# Patient Record
Sex: FEMALE | Race: WHITE | NOT HISPANIC OR LATINO | Employment: FULL TIME
[De-identification: names, ages, dates, MRNs, and addresses within clinical notes are randomized per-mention and may not be internally consistent; named-entity substitution may affect disease eponyms.]

---

## 2017-08-04 ENCOUNTER — RECORDS - HEALTHEAST (OUTPATIENT)
Dept: ADMINISTRATIVE | Facility: OTHER | Age: 57
End: 2017-08-04

## 2020-09-11 ENCOUNTER — HOSPITAL ENCOUNTER (OUTPATIENT)
Facility: CLINIC | Age: 60
Setting detail: OBSERVATION
Discharge: HOME | End: 2020-09-12
Attending: FAMILY MEDICINE | Admitting: SURGERY
Payer: COMMERCIAL

## 2020-09-11 ENCOUNTER — ANESTHESIA (OUTPATIENT)
Dept: SURGERY | Facility: CLINIC | Age: 60
End: 2020-09-11

## 2020-09-11 ENCOUNTER — ANESTHESIA EVENT (OUTPATIENT)
Dept: SURGERY | Facility: CLINIC | Age: 60
End: 2020-09-11
Payer: COMMERCIAL

## 2020-09-11 ENCOUNTER — APPOINTMENT (OUTPATIENT)
Dept: ULTRASOUND IMAGING | Facility: CLINIC | Age: 60
End: 2020-09-11
Attending: FAMILY MEDICINE
Payer: COMMERCIAL

## 2020-09-11 ENCOUNTER — ANESTHESIA EVENT (OUTPATIENT)
Dept: SURGERY | Facility: CLINIC | Age: 60
End: 2020-09-11

## 2020-09-11 DIAGNOSIS — Z20.828 VIRAL DISEASE EXPOSURE: ICD-10-CM

## 2020-09-11 DIAGNOSIS — Z90.49 S/P LAPAROSCOPIC CHOLECYSTECTOMY: ICD-10-CM

## 2020-09-11 DIAGNOSIS — K80.00 CALCULUS OF GALLBLADDER WITH ACUTE CHOLECYSTITIS WITHOUT OBSTRUCTION: ICD-10-CM

## 2020-09-11 DIAGNOSIS — K80.00 CALCULUS OF GALLBLADDER WITH ACUTE CHOLECYSTITIS WITHOUT OBSTRUCTION: Primary | ICD-10-CM

## 2020-09-11 DIAGNOSIS — K82.8 ADHESION OF GALLBLADDER: ICD-10-CM

## 2020-09-11 PROBLEM — K80.20 GALLSTONES: Status: ACTIVE | Noted: 2020-09-11

## 2020-09-11 LAB
ALBUMIN SERPL-MCNC: 4.1 G/DL (ref 3.4–5)
ALBUMIN UR-MCNC: NEGATIVE MG/DL
ALP SERPL-CCNC: 89 U/L (ref 40–150)
ALT SERPL W P-5'-P-CCNC: 29 U/L (ref 0–50)
AMORPH CRY #/AREA URNS HPF: ABNORMAL /HPF
ANION GAP SERPL CALCULATED.3IONS-SCNC: 8 MMOL/L (ref 3–14)
APPEARANCE UR: ABNORMAL
AST SERPL W P-5'-P-CCNC: 22 U/L (ref 0–45)
BASOPHILS # BLD AUTO: 0.1 10E9/L (ref 0–0.2)
BASOPHILS NFR BLD AUTO: 0.7 %
BILIRUB SERPL-MCNC: 0.4 MG/DL (ref 0.2–1.3)
BILIRUB UR QL STRIP: NEGATIVE
BUN SERPL-MCNC: 17 MG/DL (ref 7–30)
CALCIUM SERPL-MCNC: 10.3 MG/DL (ref 8.5–10.1)
CHLORIDE SERPL-SCNC: 105 MMOL/L (ref 94–109)
CO2 SERPL-SCNC: 28 MMOL/L (ref 20–32)
COLOR UR AUTO: ABNORMAL
CREAT SERPL-MCNC: 0.62 MG/DL (ref 0.52–1.04)
DIFFERENTIAL METHOD BLD: ABNORMAL
EOSINOPHIL # BLD AUTO: 0.1 10E9/L (ref 0–0.7)
EOSINOPHIL NFR BLD AUTO: 1.8 %
ERYTHROCYTE [DISTWIDTH] IN BLOOD BY AUTOMATED COUNT: 14.4 % (ref 10–15)
GFR SERPL CREATININE-BSD FRML MDRD: >90 ML/MIN/{1.73_M2}
GLUCOSE SERPL-MCNC: 134 MG/DL (ref 70–99)
GLUCOSE UR STRIP-MCNC: NEGATIVE MG/DL
HCT VFR BLD AUTO: 42.8 % (ref 35–47)
HGB BLD-MCNC: 13.6 G/DL (ref 11.7–15.7)
HGB UR QL STRIP: NEGATIVE
IMM GRANULOCYTES # BLD: 0 10E9/L (ref 0–0.4)
IMM GRANULOCYTES NFR BLD: 0.1 %
KETONES UR STRIP-MCNC: 5 MG/DL
LABORATORY COMMENT REPORT: NORMAL
LEUKOCYTE ESTERASE UR QL STRIP: NEGATIVE
LIPASE SERPL-CCNC: 110 U/L (ref 73–393)
LYMPHOCYTES # BLD AUTO: 1.9 10E9/L (ref 0.8–5.3)
LYMPHOCYTES NFR BLD AUTO: 26.3 %
MCH RBC QN AUTO: 26.3 PG (ref 26.5–33)
MCHC RBC AUTO-ENTMCNC: 31.8 G/DL (ref 31.5–36.5)
MCV RBC AUTO: 83 FL (ref 78–100)
MONOCYTES # BLD AUTO: 0.5 10E9/L (ref 0–1.3)
MONOCYTES NFR BLD AUTO: 7.4 %
NEUTROPHILS # BLD AUTO: 4.5 10E9/L (ref 1.6–8.3)
NEUTROPHILS NFR BLD AUTO: 63.7 %
NITRATE UR QL: NEGATIVE
NRBC # BLD AUTO: 0 10*3/UL
NRBC BLD AUTO-RTO: 0 /100
PH UR STRIP: 6 PH (ref 5–7)
PLATELET # BLD AUTO: 343 10E9/L (ref 150–450)
POTASSIUM SERPL-SCNC: 3.4 MMOL/L (ref 3.4–5.3)
PROT SERPL-MCNC: 8.1 G/DL (ref 6.8–8.8)
RBC # BLD AUTO: 5.18 10E12/L (ref 3.8–5.2)
RBC #/AREA URNS AUTO: 1 /HPF (ref 0–2)
SARS-COV-2 RNA SPEC QL NAA+PROBE: NEGATIVE
SARS-COV-2 RNA SPEC QL NAA+PROBE: NORMAL
SODIUM SERPL-SCNC: 141 MMOL/L (ref 133–144)
SOURCE: ABNORMAL
SP GR UR STRIP: 1.01 (ref 1–1.03)
SPECIMEN SOURCE: NORMAL
SPECIMEN SOURCE: NORMAL
SQUAMOUS #/AREA URNS AUTO: 8 /HPF (ref 0–1)
TROPONIN I SERPL-MCNC: <0.015 UG/L (ref 0–0.04)
UROBILINOGEN UR STRIP-MCNC: 0 MG/DL (ref 0–2)
WBC # BLD AUTO: 7 10E9/L (ref 4–11)
WBC #/AREA URNS AUTO: 2 /HPF (ref 0–5)

## 2020-09-11 PROCEDURE — 96366 THER/PROPH/DIAG IV INF ADDON: CPT | Performed by: FAMILY MEDICINE

## 2020-09-11 PROCEDURE — 25800030 ZZH RX IP 258 OP 636: Performed by: FAMILY MEDICINE

## 2020-09-11 PROCEDURE — 99285 EMERGENCY DEPT VISIT HI MDM: CPT | Mod: Z6 | Performed by: FAMILY MEDICINE

## 2020-09-11 PROCEDURE — 84484 ASSAY OF TROPONIN QUANT: CPT | Performed by: FAMILY MEDICINE

## 2020-09-11 PROCEDURE — 25800030 ZZH RX IP 258 OP 636: Performed by: NURSE ANESTHETIST, CERTIFIED REGISTERED

## 2020-09-11 PROCEDURE — 81001 URINALYSIS AUTO W/SCOPE: CPT | Performed by: FAMILY MEDICINE

## 2020-09-11 PROCEDURE — G0378 HOSPITAL OBSERVATION PER HR: HCPCS

## 2020-09-11 PROCEDURE — 99285 EMERGENCY DEPT VISIT HI MDM: CPT | Mod: 25 | Performed by: FAMILY MEDICINE

## 2020-09-11 PROCEDURE — 96361 HYDRATE IV INFUSION ADD-ON: CPT | Performed by: FAMILY MEDICINE

## 2020-09-11 PROCEDURE — 96365 THER/PROPH/DIAG IV INF INIT: CPT | Performed by: FAMILY MEDICINE

## 2020-09-11 PROCEDURE — 83690 ASSAY OF LIPASE: CPT | Performed by: FAMILY MEDICINE

## 2020-09-11 PROCEDURE — 25000128 H RX IP 250 OP 636: Performed by: FAMILY MEDICINE

## 2020-09-11 PROCEDURE — U0003 INFECTIOUS AGENT DETECTION BY NUCLEIC ACID (DNA OR RNA); SEVERE ACUTE RESPIRATORY SYNDROME CORONAVIRUS 2 (SARS-COV-2) (CORONAVIRUS DISEASE [COVID-19]), AMPLIFIED PROBE TECHNIQUE, MAKING USE OF HIGH THROUGHPUT TECHNOLOGIES AS DESCRIBED BY CMS-2020-01-R: HCPCS | Performed by: FAMILY MEDICINE

## 2020-09-11 PROCEDURE — 85025 COMPLETE CBC W/AUTO DIFF WBC: CPT | Performed by: FAMILY MEDICINE

## 2020-09-11 PROCEDURE — 80053 COMPREHEN METABOLIC PANEL: CPT | Performed by: FAMILY MEDICINE

## 2020-09-11 PROCEDURE — 25800030 ZZH RX IP 258 OP 636: Performed by: SURGERY

## 2020-09-11 PROCEDURE — 25000128 H RX IP 250 OP 636: Performed by: SURGERY

## 2020-09-11 PROCEDURE — C9803 HOPD COVID-19 SPEC COLLECT: HCPCS | Performed by: FAMILY MEDICINE

## 2020-09-11 PROCEDURE — 96375 TX/PRO/DX INJ NEW DRUG ADDON: CPT | Performed by: FAMILY MEDICINE

## 2020-09-11 PROCEDURE — 76700 US EXAM ABDOM COMPLETE: CPT

## 2020-09-11 PROCEDURE — 99203 OFFICE O/P NEW LOW 30 MIN: CPT | Mod: 57 | Performed by: SURGERY

## 2020-09-11 RX ORDER — ACETAMINOPHEN 325 MG/1
975 TABLET ORAL ONCE
Status: DISCONTINUED | OUTPATIENT
Start: 2020-09-11 | End: 2020-09-11 | Stop reason: HOSPADM

## 2020-09-11 RX ORDER — LIDOCAINE 40 MG/G
CREAM TOPICAL
Status: DISCONTINUED | OUTPATIENT
Start: 2020-09-11 | End: 2020-09-11 | Stop reason: HOSPADM

## 2020-09-11 RX ORDER — SODIUM CHLORIDE, SODIUM LACTATE, POTASSIUM CHLORIDE, CALCIUM CHLORIDE 600; 310; 30; 20 MG/100ML; MG/100ML; MG/100ML; MG/100ML
INJECTION, SOLUTION INTRAVENOUS CONTINUOUS
Status: DISCONTINUED | OUTPATIENT
Start: 2020-09-11 | End: 2020-09-11 | Stop reason: HOSPADM

## 2020-09-11 RX ORDER — KETOROLAC TROMETHAMINE 15 MG/ML
15 INJECTION, SOLUTION INTRAMUSCULAR; INTRAVENOUS ONCE
Status: COMPLETED | OUTPATIENT
Start: 2020-09-11 | End: 2020-09-11

## 2020-09-11 RX ORDER — LIDOCAINE 40 MG/G
CREAM TOPICAL
Status: DISCONTINUED | OUTPATIENT
Start: 2020-09-11 | End: 2020-09-12 | Stop reason: HOSPADM

## 2020-09-11 RX ORDER — DEXTROSE MONOHYDRATE, SODIUM CHLORIDE, AND POTASSIUM CHLORIDE 50; 1.49; 4.5 G/1000ML; G/1000ML; G/1000ML
INJECTION, SOLUTION INTRAVENOUS CONTINUOUS
Status: DISCONTINUED | OUTPATIENT
Start: 2020-09-11 | End: 2020-09-11

## 2020-09-11 RX ORDER — INDOCYANINE GREEN AND WATER 25 MG
2.5 KIT INJECTION ONCE
Status: DISCONTINUED | OUTPATIENT
Start: 2020-09-11 | End: 2020-09-11 | Stop reason: HOSPADM

## 2020-09-11 RX ORDER — PROCHLORPERAZINE MALEATE 5 MG
10 TABLET ORAL EVERY 6 HOURS PRN
Status: DISCONTINUED | OUTPATIENT
Start: 2020-09-11 | End: 2020-09-12 | Stop reason: HOSPADM

## 2020-09-11 RX ORDER — ACETAMINOPHEN 325 MG/1
650 TABLET ORAL EVERY 4 HOURS PRN
Status: DISCONTINUED | OUTPATIENT
Start: 2020-09-11 | End: 2020-09-12 | Stop reason: HOSPADM

## 2020-09-11 RX ORDER — NALOXONE HYDROCHLORIDE 0.4 MG/ML
.1-.4 INJECTION, SOLUTION INTRAMUSCULAR; INTRAVENOUS; SUBCUTANEOUS
Status: DISCONTINUED | OUTPATIENT
Start: 2020-09-11 | End: 2020-09-12 | Stop reason: HOSPADM

## 2020-09-11 RX ORDER — ONDANSETRON 2 MG/ML
4 INJECTION INTRAMUSCULAR; INTRAVENOUS EVERY 6 HOURS PRN
Status: DISCONTINUED | OUTPATIENT
Start: 2020-09-11 | End: 2020-09-12 | Stop reason: HOSPADM

## 2020-09-11 RX ORDER — HYDROMORPHONE HYDROCHLORIDE 1 MG/ML
0.5 INJECTION, SOLUTION INTRAMUSCULAR; INTRAVENOUS; SUBCUTANEOUS
Status: DISCONTINUED | OUTPATIENT
Start: 2020-09-11 | End: 2020-09-12 | Stop reason: HOSPADM

## 2020-09-11 RX ORDER — ONDANSETRON 2 MG/ML
4 INJECTION INTRAMUSCULAR; INTRAVENOUS ONCE
Status: COMPLETED | OUTPATIENT
Start: 2020-09-11 | End: 2020-09-11

## 2020-09-11 RX ORDER — PROCHLORPERAZINE 25 MG
25 SUPPOSITORY, RECTAL RECTAL EVERY 12 HOURS PRN
Status: DISCONTINUED | OUTPATIENT
Start: 2020-09-11 | End: 2020-09-12 | Stop reason: HOSPADM

## 2020-09-11 RX ORDER — GABAPENTIN 300 MG/1
300 CAPSULE ORAL ONCE
Status: DISCONTINUED | OUTPATIENT
Start: 2020-09-11 | End: 2020-09-11 | Stop reason: HOSPADM

## 2020-09-11 RX ORDER — ONDANSETRON 4 MG/1
4 TABLET, ORALLY DISINTEGRATING ORAL EVERY 6 HOURS PRN
Status: DISCONTINUED | OUTPATIENT
Start: 2020-09-11 | End: 2020-09-12 | Stop reason: HOSPADM

## 2020-09-11 RX ORDER — HYDROMORPHONE HYDROCHLORIDE 1 MG/ML
0.5 INJECTION, SOLUTION INTRAMUSCULAR; INTRAVENOUS; SUBCUTANEOUS ONCE
Status: COMPLETED | OUTPATIENT
Start: 2020-09-11 | End: 2020-09-11

## 2020-09-11 RX ADMIN — HYDROMORPHONE HYDROCHLORIDE 0.5 MG: 1 INJECTION, SOLUTION INTRAMUSCULAR; INTRAVENOUS; SUBCUTANEOUS at 06:00

## 2020-09-11 RX ADMIN — DEXTROSE AND SODIUM CHLORIDE: 5; 900 INJECTION, SOLUTION INTRAVENOUS at 16:02

## 2020-09-11 RX ADMIN — TAZOBACTAM SODIUM AND PIPERACILLIN SODIUM 3.38 G: 375; 3 INJECTION, SOLUTION INTRAVENOUS at 21:53

## 2020-09-11 RX ADMIN — ONDANSETRON 4 MG: 2 INJECTION INTRAMUSCULAR; INTRAVENOUS at 05:20

## 2020-09-11 RX ADMIN — TAZOBACTAM SODIUM AND PIPERACILLIN SODIUM 3.38 G: 375; 3 INJECTION, SOLUTION INTRAVENOUS at 08:35

## 2020-09-11 RX ADMIN — TAZOBACTAM SODIUM AND PIPERACILLIN SODIUM 3.38 G: 375; 3 INJECTION, SOLUTION INTRAVENOUS at 16:03

## 2020-09-11 RX ADMIN — POTASSIUM CHLORIDE, DEXTROSE MONOHYDRATE AND SODIUM CHLORIDE: 150; 5; 450 INJECTION, SOLUTION INTRAVENOUS at 08:16

## 2020-09-11 RX ADMIN — SODIUM CHLORIDE, POTASSIUM CHLORIDE, SODIUM LACTATE AND CALCIUM CHLORIDE 10 ML: 600; 310; 30; 20 INJECTION, SOLUTION INTRAVENOUS at 13:08

## 2020-09-11 RX ADMIN — SODIUM CHLORIDE, POTASSIUM CHLORIDE, SODIUM LACTATE AND CALCIUM CHLORIDE 1000 ML: 600; 310; 30; 20 INJECTION, SOLUTION INTRAVENOUS at 05:17

## 2020-09-11 RX ADMIN — KETOROLAC TROMETHAMINE 15 MG: 15 INJECTION, SOLUTION INTRAMUSCULAR; INTRAVENOUS at 05:20

## 2020-09-11 ASSESSMENT — MIFFLIN-ST. JEOR
SCORE: 1317.51
SCORE: 1321.5

## 2020-09-11 NOTE — ANESTHESIA PREPROCEDURE EVALUATION
Anesthesia Pre-Procedure Evaluation    Patient: Kindra Herbert   MRN: [id number] : 1960          Preoperative Diagnosis: Calculus of gallbladder with acute cholecystitis without obstruction [K80.00]    Procedure(s):  CHOLECYSTECTOMY, LAPAROSCOPIC    Past Medical History:   Diagnosis Date     History of laparoscopy      History of total vaginal hysterectomy      Right kidney stone 2014    laser lithotripsy with stent placement     Vaginal vault prolapse, posthysterectomy      No past surgical history on file.    Anesthesia Evaluation     . Pt has had prior anesthetic. Type: General and MAC    No history of anesthetic complications          ROS/MED HX    ENT/Pulmonary:  - neg pulmonary ROS     Neurologic:     (+)migraines,     Cardiovascular:     (+) Dyslipidemia, ----. : . . . :. . Previous cardiac testing date:results:date: results:ECG reviewed date:16 results:Sinus Rhythm   -Nonspecific QRS widening.     BORDERLINE   date: results:          METS/Exercise Tolerance:  >4 METS   Hematologic:  - neg hematologic  ROS       Musculoskeletal:   (+) arthritis,  other musculoskeletal- bilateral knee pain      GI/Hepatic: Comment: Fecal incontinence    (+) GERD Other, cholecystitis/cholelithiasis,       Renal/Genitourinary:     (+) Nephrolithiasis ,       Endo:  - neg endo ROS       Psychiatric:     (+) psychiatric history anxiety      Infectious Disease:  - neg infectious disease ROS       Malignancy:      - no malignancy   Other:    - neg other ROS                        Physical Exam  Normal systems: cardiovascular and pulmonary    Airway   Mallampati: I  TM distance: >3 FB  Neck ROM: full    Dental   (+) caps    Cardiovascular       Pulmonary             Lab Results   Component Value Date    WBC 7.0 2020    HGB 13.6 2020    HCT 42.8 2020     2020     2020    POTASSIUM 3.4 2020    CHLORIDE 105 2020    CO2 28 2020    BUN 17 2020    CR  "0.62 09/11/2020     (H) 09/11/2020    GRUPO 10.3 (H) 09/11/2020    ALBUMIN 4.1 09/11/2020    PROTTOTAL 8.1 09/11/2020    ALT 29 09/11/2020    AST 22 09/11/2020    ALKPHOS 89 09/11/2020    BILITOTAL 0.4 09/11/2020    LIPASE 110 09/11/2020       Preop Vitals  BP Readings from Last 3 Encounters:   09/11/20 114/59   11/09/16 111/83   07/28/13 135/86    Pulse Readings from Last 3 Encounters:   09/11/20 73   11/09/16 86   07/28/13 89      Resp Readings from Last 3 Encounters:   09/11/20 16   11/09/16 16   07/28/13 16    SpO2 Readings from Last 3 Encounters:   09/11/20 98%   11/09/16 100%   07/28/13 100%      Temp Readings from Last 1 Encounters:   09/11/20 36.7  C (98.1  F) (Oral)    Ht Readings from Last 1 Encounters:   09/11/20 1.676 m (5' 5.98\")      Wt Readings from Last 1 Encounters:   09/11/20 73 kg (160 lb 15 oz)    Estimated body mass index is 25.99 kg/m  as calculated from the following:    Height as of 9/11/20: 1.676 m (5' 5.98\").    Weight as of 9/11/20: 73 kg (160 lb 15 oz).       Anesthesia Plan      History & Physical Review  History and physical reviewed and following examination; no interval change.    ASA Status:  2 emergent.    NPO Status:  > 6 hours    Plan for General with Propofol and Intravenous induction. Maintenance will be Balanced.    PONV prophylaxis:  Ondansetron (or other 5HT-3) and Dexamethasone or Solumedrol  Additional equipment: Videolaryngoscope        Postoperative Care  Postoperative pain management:  IV analgesics, Oral pain medications and Multi-modal analgesia.      Consents  Anesthetic plan, risks, benefits and alternatives discussed with:  Patient..                   JUAQUIN Shin CRNA  "

## 2020-09-11 NOTE — ANESTHESIA PREPROCEDURE EVALUATION
Anesthesia Pre-Procedure Evaluation    Patient: Kindra Herbert   MRN: [id number] : 1960          Preoperative Diagnosis: Calculus of gallbladder with acute cholecystitis without obstruction [K80.00]    Procedure(s):  CHOLECYSTECTOMY, LAPAROSCOPIC    No past medical history on file.  No past surgical history on file.    Anesthesia Evaluation     .             ROS/MED HX    ENT/Pulmonary:  - neg pulmonary ROS     Neurologic:     (+)migraines,     Cardiovascular:     (+) Dyslipidemia, ----. : . . . :. . Previous cardiac testing date:results:date: results:ECG reviewed date:16 results:Sinus Rhythm   -Nonspecific QRS widening.     BORDERLINE   date: results:          METS/Exercise Tolerance:  >4 METS   Hematologic:  - neg hematologic  ROS       Musculoskeletal:   (+) arthritis,  other musculoskeletal- bilateral knee pain      GI/Hepatic: Comment: Fecal incontinence    (+) cholecystitis/cholelithiasis,       Renal/Genitourinary:     (+) Nephrolithiasis ,       Endo:  - neg endo ROS       Psychiatric:     (+) psychiatric history anxiety      Infectious Disease:  - neg infectious disease ROS       Malignancy:      - no malignancy   Other:    - neg other ROS                             Lab Results   Component Value Date    WBC 7.0 2020    HGB 13.6 2020    HCT 42.8 2020     2020     2020    POTASSIUM 3.4 2020    CHLORIDE 105 2020    CO2 28 2020    BUN 17 2020    CR 0.62 2020     (H) 2020    GRUPO 10.3 (H) 2020    ALBUMIN 4.1 2020    PROTTOTAL 8.1 2020    ALT 29 2020    AST 22 2020    ALKPHOS 89 2020    BILITOTAL 0.4 2020    LIPASE 110 2020       Preop Vitals  BP Readings from Last 3 Encounters:   20 94/56   16 111/83   13 135/86    Pulse Readings from Last 3 Encounters:   20 (!) 48   16 86   13 89      Resp Readings from Last 3 Encounters:  "  09/11/20 18   11/09/16 16   07/28/13 16    SpO2 Readings from Last 3 Encounters:   09/11/20 95%   11/09/16 100%   07/28/13 100%      Temp Readings from Last 1 Encounters:   09/11/20 36.9  C (98.4  F) (Oral)    Ht Readings from Last 1 Encounters:   09/11/20 1.676 m (5' 6\")      Wt Readings from Last 1 Encounters:   09/11/20 72.6 kg (160 lb)    Estimated body mass index is 25.82 kg/m  as calculated from the following:    Height as of this encounter: 1.676 m (5' 6\").    Weight as of this encounter: 72.6 kg (160 lb).                   JUAQUIN Shin CRNA  "

## 2020-09-11 NOTE — ED TRIAGE NOTES
Pt arrives by private car. She developed epigastric pain  10 hrs ago after eating. She describes it as epigastric and constant . It now radiates to the mid back. She has a long Hx of kidney stones solis states this is a different pain. She has has nausea and dry heaves. She denies any problems or changes with bowel or bladder. No blood in urine or stool. No fevers. She appears uncomfortable. She does not feel bloated.

## 2020-09-11 NOTE — PLAN OF CARE
"[facility] [place]MISSION [place]    Patient admitted to room 2407 at approximately 1545 via wheel chair from ED. Patient was accompanied by transport tech.     Verbal SBAR report received from Naima prior to patient arrival.     Patient ambulated to bed independently. Patient alert and oriented X 3. The patient is not having any pain. 0-10 Pain Scale: 10. Admission vital signs: Blood pressure 114/59, pulse 73, temperature 98.1  F (36.7  C), temperature source Oral, resp. rate 16, height 1.676 m (5' 5.98\"), weight 73 kg (160 lb 15 oz), SpO2 98 %. Patient was oriented to plan of care, call light, bed controls, tv, telephone, bathroom, and visiting hours.     Risk Assessment    The following safety risks were identified during admission: fall. Yellow risk band applied: YES.     Skin Initial Assessment    This writer admitted this patient and completed a full skin assessment and Mayur score in the Adult PCS flowsheet. Appropriate interventions initiated as needed.     Secondary skin check completed by: Cecy Arenas    Patient has a Martin to Observation order: Yes  Observation education completed and documented: Yes      Colleen Day RN      "

## 2020-09-11 NOTE — CONSULTS
PCP:  Clinic, [facility]  Requesting: Dr. Guillermo Wen    Chief complaint: Right upper quadrant and back pain, diagnosis of gallstones    History of Present Illness: Kindra is a 60-year-old female with a fairly unremarkable past medical history who presented to the emergency room early this morning with back pain.  She had a fairly abrupt onset of pain in her right flank and into her back.  This started approximately 11 hours prior to her coming in.  The pain was described as a dull and aching sensation and occurred an hour or so after the light meal.  Some nausea but no vomiting.  She had a CT scan here at outside facility for complaints of right-sided abdominal pain a week ago and was noted to have a 2.1 cm gallstone.  She has not received the report from that exam as of her visit this morning.    She reports no change in the color of her urine or stool.  Her only abdominal procedure is a laparoscopy in the 1980s.    All of her labs were normal.  An ultrasound of her gallbladder showed a stone in the neck of the gallbladder and a negative sonographic Feliz sign.  No biliary ductal dilatation.    Histories:  Past Medical History:   Diagnosis Date     History of laparoscopy 1987     History of total vaginal hysterectomy 2006     Right kidney stone 2014    laser lithotripsy with stent placement     Vaginal vault prolapse, posthysterectomy        History reviewed. No pertinent surgical history.    History reviewed. No pertinent family history.    Social History     Tobacco Use     Smoking status: Not on file   Substance Use Topics     Alcohol use: Not on file       No current outpatient medications on file.       Allergies   Allergen Reactions     Sulfa Drugs Hives       Images:  Recent Results (from the past 744 hour(s))   Abdomen US, complete    Narrative    ULTRASOUND ABDOMEN COMPLETE 9/11/2020 7:00 AM    CLINICAL HISTORY: Epigastric pain.    TECHNIQUE: Complete abdominal ultrasound.    COMPARISON: CT of the  abdomen and pelvis performed 7/28/2013.    FINDINGS:    GALLBLADDER: A gallstone is noted within the gallbladder neck. A 1.1  cm echogenic focus along the gallbladder wall may represent sludge or  a gallbladder polyp. No gallbladder wall thickening or pericholecystic  fluid. Negative sonographic Feliz sign.    BILE DUCTS: No biliary dilatation. The common duct measures 5 mm. The  entire common duct could not be visualized on this exam.    LIVER: Normal parenchyma with smooth contour. No focal mass.    RIGHT KIDNEY: Normal size. Normal echogenicity with no hydronephrosis  or mass.     LEFT KIDNEY: Normal size. Normal echogenicity with no hydronephrosis  or mass.     SPLEEN: Normal.    PANCREAS: The visualized portions are normal.    AORTA: Normal in caliber.     IVC: Normal where visualized.    No ascites.      Impression    IMPRESSION:  1.  Cholelithiasis. No associated gallbladder wall thickening.  2.  Small amount of sludge or a gallbladder polyp measures 1.1 cm. A  follow-up ultrasound in 6 months is recommended to ensure stability of  this finding.  3.  Otherwise unremarkable abdominal ultrasound.    MAITE CHRISTIE MD       Labs:  Results for orders placed or performed during the hospital encounter of 09/11/20   Abdomen US, complete     Status: None    Narrative    ULTRASOUND ABDOMEN COMPLETE 9/11/2020 7:00 AM    CLINICAL HISTORY: Epigastric pain.    TECHNIQUE: Complete abdominal ultrasound.    COMPARISON: CT of the abdomen and pelvis performed 7/28/2013.    FINDINGS:    GALLBLADDER: A gallstone is noted within the gallbladder neck. A 1.1  cm echogenic focus along the gallbladder wall may represent sludge or  a gallbladder polyp. No gallbladder wall thickening or pericholecystic  fluid. Negative sonographic Feliz sign.    BILE DUCTS: No biliary dilatation. The common duct measures 5 mm. The  entire common duct could not be visualized on this exam.    LIVER: Normal parenchyma with smooth contour. No focal  mass.    RIGHT KIDNEY: Normal size. Normal echogenicity with no hydronephrosis  or mass.     LEFT KIDNEY: Normal size. Normal echogenicity with no hydronephrosis  or mass.     SPLEEN: Normal.    PANCREAS: The visualized portions are normal.    AORTA: Normal in caliber.     IVC: Normal where visualized.    No ascites.      Impression    IMPRESSION:  1.  Cholelithiasis. No associated gallbladder wall thickening.  2.  Small amount of sludge or a gallbladder polyp measures 1.1 cm. A  follow-up ultrasound in 6 months is recommended to ensure stability of  this finding.  3.  Otherwise unremarkable abdominal ultrasound.    MAITE CHRISTIE MD   CBC with platelets differential     Status: Abnormal   Result Value Ref Range    WBC 7.0 4.0 - 11.0 10e9/L    RBC Count 5.18 3.8 - 5.2 10e12/L    Hemoglobin 13.6 11.7 - 15.7 g/dL    Hematocrit 42.8 35.0 - 47.0 %    MCV 83 78 - 100 fl    MCH 26.3 (L) 26.5 - 33.0 pg    MCHC 31.8 31.5 - 36.5 g/dL    RDW 14.4 10.0 - 15.0 %    Platelet Count 343 150 - 450 10e9/L    Diff Method Automated Method     % Neutrophils 63.7 %    % Lymphocytes 26.3 %    % Monocytes 7.4 %    % Eosinophils 1.8 %    % Basophils 0.7 %    % Immature Granulocytes 0.1 %    Nucleated RBCs 0 0 /100    Absolute Neutrophil 4.5 1.6 - 8.3 10e9/L    Absolute Lymphocytes 1.9 0.8 - 5.3 10e9/L    Absolute Monocytes 0.5 0.0 - 1.3 10e9/L    Absolute Eosinophils 0.1 0.0 - 0.7 10e9/L    Absolute Basophils 0.1 0.0 - 0.2 10e9/L    Abs Immature Granulocytes 0.0 0 - 0.4 10e9/L    Absolute Nucleated RBC 0.0    Comprehensive metabolic panel     Status: Abnormal   Result Value Ref Range    Sodium 141 133 - 144 mmol/L    Potassium 3.4 3.4 - 5.3 mmol/L    Chloride 105 94 - 109 mmol/L    Carbon Dioxide 28 20 - 32 mmol/L    Anion Gap 8 3 - 14 mmol/L    Glucose 134 (H) 70 - 99 mg/dL    Urea Nitrogen 17 7 - 30 mg/dL    Creatinine 0.62 0.52 - 1.04 mg/dL    GFR Estimate >90 >60 mL/min/[1.73_m2]    GFR Estimate If Black >90 >60 mL/min/[1.73_m2]     Calcium 10.3 (H) 8.5 - 10.1 mg/dL    Bilirubin Total 0.4 0.2 - 1.3 mg/dL    Albumin 4.1 3.4 - 5.0 g/dL    Protein Total 8.1 6.8 - 8.8 g/dL    Alkaline Phosphatase 89 40 - 150 U/L    ALT 29 0 - 50 U/L    AST 22 0 - 45 U/L   UA reflex to Microscopic     Status: Abnormal   Result Value Ref Range    Color Urine Straw     Appearance Urine Slightly Cloudy     Glucose Urine Negative NEG^Negative mg/dL    Bilirubin Urine Negative NEG^Negative    Ketones Urine 5 (A) NEG^Negative mg/dL    Specific Gravity Urine 1.014 1.003 - 1.035    Blood Urine Negative NEG^Negative    pH Urine 6.0 5.0 - 7.0 pH    Protein Albumin Urine Negative NEG^Negative mg/dL    Urobilinogen mg/dL 0.0 0.0 - 2.0 mg/dL    Nitrite Urine Negative NEG^Negative    Leukocyte Esterase Urine Negative NEG^Negative    Source Unspecified Urine     RBC Urine 1 0 - 2 /HPF    WBC Urine 2 0 - 5 /HPF    Squamous Epithelial /HPF Urine 8 (H) 0 - 1 /HPF    Amorphous Crystals Few (A) NEG^Negative /HPF   Lipase     Status: None   Result Value Ref Range    Lipase 110 73 - 393 U/L   Troponin I     Status: None   Result Value Ref Range    Troponin I ES <0.015 0.000 - 0.045 ug/L   Asymptomatic COVID-19 Virus (Coronavirus) by PCR     Status: None    Specimen: Nasopharyngeal   Result Value Ref Range    COVID-19 Virus PCR to [facility] - Source Nasopharyngeal     COVID-19 Virus PCR to [facility] - Result       Test received-See reflex to IDDL test SARS CoV2 (COVID-19) Virus RT-PCR   SARS-CoV-2 COVID-19 Virus (Coronavirus) RT-PCR Nasopharyngeal     Status: None    Specimen: Nasopharyngeal   Result Value Ref Range    SARS-CoV-2 Virus Specimen Source Nasopharyngeal     SARS-CoV-2 PCR Result NEGATIVE     SARS-CoV-2 PCR Comment       Testing was performed using the [employer] Xpress SARS-CoV-2 Assay on the Cepheid Gene-Xpert   Instrument Systems. Additional information about this Emergency Use Authorization (EUA)   assay can be found via the Lab Guide.         ROS:  Constitutional - Denies fevers,  "weight loss, malaise, lethargy  Neuro - Denies tremors or seizures  Pulmon - Denies SOB, dyspnea, hemoptysis, chronic cough or use of an inhaler  CV - Denies CP, SOB, lower extremity edema, difficulty w/ stairs  GI - Denies hematemesis, BRBPR, melena, chronic diarrhea   - Denies hematuria, difficulty voiding  Hematology - Denies blood clotting disorders, chronic anemias      /69 (BP Location: Right arm)   Pulse 76   Temp 98.7  F (37.1  C) (Oral)   Resp 18   Ht 1.676 m (5' 6\")   Wt 72.6 kg (160 lb)   SpO2 96%   BMI 25.82 kg/m      Exam:  General - Alert and Oriented X4, NAD, well nourished  HEENT - Normocephalic, atraumatic  Lungs -respirations are unlabored, chest wall excursion normal  CV - Heart RRR  Abdomen - Soft, mild discomfort to palpation right upper quadrant, no masses, well-healed small infraumbilical incision, no umbilical hernia  Neuro - intact  Extremities - No cyanosis, clubbing or edema.      Assessment and Plan: Symptomatic cholelithiasis bordering on acute cholecystitis.  At the time of my visit her pain was improving, but she is fearful of eating anything because the pain might recur.  I spent about 30 minutes with her and her  discussing the anatomy and pathophysiology of biliary disease including the procedure itself (laparoscopic cholecystectomy).  We discussed the risks benefits and alternatives.  Initially, plans were made to proceed with surgery today, but her COVID test was delayed.  By the time it was available, surgical staff were busy with a more urgent case.    For that reason, and issues of potential pain control I elected to bring her in the hospital on observation overnight and plan for surgery at 8 AM tomorrow.  Dr. Munoz has graciously agreed to do the procedure as she is on-call this weekend.  The patient was agreeable.  I anticipate she will be home by noon tomorrow.  All of her questions were answered.  Consultation time was 30 minutes in face-to-face " contact.        Vipin Castro MD FACS

## 2020-09-11 NOTE — OR NURSING
Patient is now being admitted to observation due to Covid test results still pending. Will have surgery in the am.

## 2020-09-11 NOTE — ED PROVIDER NOTES
History     Chief Complaint   Patient presents with     Abdominal Pain     asociated with back pain last 10 hrs. Nausea     HPI  Kindra Herbert is a 60 year old female, past medical history is significant for hypertension, irritable bowel syndrome, presents to the emergency department with concerns of abdominal pain beginning approximately 11 hours prior to presentation.  History is obtained from patient who presents with her  with concerns of epigastric area abdominal pain that radiates bilaterally into the back.  The pain is described as dull and aching and has onset approximately half an hour after a light nonfatty meal (the patient recently changed her diet and is now eating far less carbohydrate and far more protein; her dinner consisted of a grilled hamburger topped with fuentes, cheese, and tomatoes, no bun).  No alcohol recently.  Mild nausea but no vomiting.  The patient tried Tums as well as acetaminophen which did nothing for the pain.  Seems to be a little bit better if she is leaning forward.  Not associate with any lightheadedness shortness of air palpitations.  The patient states that she has had kidney stones in the past but this pain is very different as the kidney stone pain is typically unilateral rather than midline and on both sides.    The patient notes that she had abdominal CT scan performed through her primary care provider for right lower quadrant pain and weakness approximately a week ago.  She has not heard the results.  I reviewed care everywhere the resulted CT as follows:    DATE/TIME: 9/2/2020 3:35 PM    INDICATION: Abdominal Pain, Generalized Abdominal Pain, Rlq (right Lower  Quadrant)  COMPARISON: 03/16/2017  TECHNIQUE: CT scan of the abdomen and pelvis was performed following injection  of IV contrast. Multiplanar reformats were obtained. Dose reduction techniques  were used.  CONTRAST: IOHEXOL 350 MG IODINE/ML  ML BOTTLE: 80mL    FINDINGS:   LOWER CHEST:  Normal.    HEPATOBILIARY: There is a 2.1 cm gallstone in the gallbladder.    PANCREAS: Normal.    SPLEEN: Normal.    ADRENAL GLANDS: Normal.    KIDNEYS/BLADDER: In the kidneys are low-density lesions, measuring up to 1.2 cm  on the right. These may represent small cysts, not requiring additional  follow-up imaging. There are calcifications in both kidneys, measuring up to 9 x  2 mm in the left lower renal pole and 7 x 4 mm in the right interpolar region.  The latter is new from prior, with decreased stones seen in the lower pole. No  hydronephrosis.    BOWEL: Stomach and small bowel are unremarkable. No obstruction. The appendix is  visualized, and within normal limits. Colon unremarkable.    LYMPH NODES: No abdominopelvic lymphadenopathy. No ascites. No free air.    VASCULATURE: No abdominal aortic aneurysm.    PELVIC ORGANS: Hysterectomy.    MUSCULOSKELETAL: Normal.    IMPRESSION:   1.  By CT, no acute findings in the visualized abdomen or pelvis.    2.  Normal appendix.    3.  Bilateral nonobstructive renal calculi. No hydronephrosis.   Status       Allergies:  Allergies   Allergen Reactions     Sulfa Drugs Hives       Problem List:    Patient Active Problem List    Diagnosis Date Noted     Calculus of gallbladder with acute cholecystitis without obstruction 09/11/2020     Priority: Medium     Added automatically from request for surgery [id number]       Gallstones 09/11/2020     Priority: Medium        Past Medical History:    Past Medical History:   Diagnosis Date     History of laparoscopy 1987     History of total vaginal hysterectomy 2006     Right kidney stone 2014     Vaginal vault prolapse, posthysterectomy        Past Surgical History:    History reviewed. No pertinent surgical history.    Family History:    History reviewed. No pertinent family history.    Social History:  Marital Status:   [2]  Social History     Tobacco Use     Smoking status: None   Substance Use Topics     Alcohol use: None      "Drug use: None        Medications:    LISINOPRIL PO  oxyCODONE (ROXICODONE) 5 MG tablet  Diphenoxylate-Atropine (LOMOTIL PO)  Ibuprofen (ADVIL PO)          Review of Systems   All other systems reviewed and are negative.      Physical Exam   BP: (!) 180/104  Pulse: 77  Temp: 98.4  F (36.9  C)  Resp: 18  Height: 167.6 cm (5' 6\")  Weight: 72.6 kg (160 lb)  SpO2: 100 %      Physical Exam  Vitals signs and nursing note reviewed.   Constitutional:       Appearance: She is well-developed and normal weight.   HENT:      Head: Normocephalic and atraumatic.      Mouth/Throat:      Mouth: Mucous membranes are moist.   Eyes:      Extraocular Movements: Extraocular movements intact.      Pupils: Pupils are equal, round, and reactive to light.   Cardiovascular:      Rate and Rhythm: Normal rate and regular rhythm.      Heart sounds: Normal heart sounds.   Pulmonary:      Effort: Pulmonary effort is normal.      Breath sounds: Normal breath sounds.   Abdominal:      General: Bowel sounds are normal.      Palpations: Abdomen is soft.      Tenderness: There is no abdominal tenderness.      Hernia: No hernia is present.   Skin:     General: Skin is warm and dry.      Capillary Refill: Capillary refill takes less than 2 seconds.   Neurological:      General: No focal deficit present.      Mental Status: She is alert.   Psychiatric:         Mood and Affect: Mood normal.         Behavior: Behavior normal.         ED Course        Procedures          Labs Ordered and Resulted from Time of ED Arrival Up to the Time of Departure from the ED   CBC WITH PLATELETS DIFFERENTIAL - Abnormal; Notable for the following components:       Result Value    MCH 26.3 (*)     All other components within normal limits   COMPREHENSIVE METABOLIC PANEL - Abnormal; Notable for the following components:    Glucose 134 (*)     Calcium 10.3 (*)     All other components within normal limits   URINE MACROSCOPIC WITH REFLEX TO MICRO - Abnormal; Notable for the " following components:    Ketones Urine 5 (*)     Squamous Epithelial /HPF Urine 8 (*)     Amorphous Crystals Few (*)     All other components within normal limits   LIPASE   TROPONIN I   PERIPHERAL IV CATHETER         ULTRASOUND ABDOMEN COMPLETE 9/11/2020 7:00 AM     CLINICAL HISTORY: Epigastric pain.     TECHNIQUE: Complete abdominal ultrasound.     COMPARISON: CT of the abdomen and pelvis performed 7/28/2013.     FINDINGS:     GALLBLADDER: A gallstone is noted within the gallbladder neck. A 1.1  cm echogenic focus along the gallbladder wall may represent sludge or  a gallbladder polyp. No gallbladder wall thickening or pericholecystic  fluid. Negative sonographic Feliz sign.     BILE DUCTS: No biliary dilatation. The common duct measures 5 mm. The  entire common duct could not be visualized on this exam.     LIVER: Normal parenchyma with smooth contour. No focal mass.     RIGHT KIDNEY: Normal size. Normal echogenicity with no hydronephrosis  or mass.      LEFT KIDNEY: Normal size. Normal echogenicity with no hydronephrosis  or mass.      SPLEEN: Normal.     PANCREAS: The visualized portions are normal.     AORTA: Normal in caliber.      IVC: Normal where visualized.     No ascites.                                                                      IMPRESSION:  1.  Cholelithiasis. No associated gallbladder wall thickening.  2.  Small amount of sludge or a gallbladder polyp measures 1.1 cm. A  follow-up ultrasound in 6 months is recommended to ensure stability of  this finding.  3.  Otherwise unremarkable abdominal ultrasound.     MAITE CHRISTIE MD  Critical Care time:  none               No results found for this or any previous visit (from the past 24 hour(s)).5:45 AM  I returned to the room, the patient has had some relief of her discomfort with Toradol but not a whole lot.  I reviewed her diagnostic evaluation here with no significant abnormals noted in her labs.  I reviewed the CT that her primary care  provider had ordered for right lower quadrant abdominal pain dated from 9/2/2020.  Although not in the impression 1 of the findings is certainly notable given the patient's pain presentation today with a 2.1 cm stone noted in the gallbladder.  This could certainly conceivably be biliary colic provoked by the high-protein and fatty meal that preceded symptom onset.  I plan to obtain ultrasound and discussed this with the patient and her .  Agreement with plan.  We will try Dilaudid further for pain.  5:59 AM  At shift change this patient was signed out to my incoming colleague Dr. Patrice Wen.  Pending ultrasound.      Medications   ketorolac (TORADOL) injection 15 mg (15 mg Intravenous Given 9/11/20 0520)   ondansetron (ZOFRAN) injection 4 mg (4 mg Intravenous Given 9/11/20 0520)   lactated ringers BOLUS 1,000 mL (0 mLs Intravenous Stopped 9/11/20 0555)   HYDROmorphone (PF) (DILAUDID) injection 0.5 mg (0.5 mg Intravenous Given 9/11/20 0600)   piperacillin-tazobactam (ZOSYN) infusion 3.375 g (0 g Intravenous Stopped 9/11/20 0935)       Assessments & Plan (with Medical Decision Making)     I have reviewed the nursing notes.    I have reviewed the findings, diagnosis, plan and need for follow up with the patient.       Discharge Medication List as of 9/12/2020  1:44 PM      START taking these medications    Details   oxyCODONE (ROXICODONE) 5 MG tablet Take 1 tablet (5 mg) by mouth every 6 hours as needed for moderate to severe pain, Disp-12 tablet,R-0, E-Prescribe             Final diagnoses:   Calculus of gallbladder with acute cholecystitis without obstruction   Calculus of gallbladder with acute cholecystitis without obstruction - Added automatically from request for surgery [id number]   S/P laparoscopic cholecystectomy       9/11/2020   [facility] EMERGENCY DEPARTMENT     Cal Jimenez MD  09/14/20 2255

## 2020-09-11 NOTE — PROGRESS NOTES
Skin affirmation note    Admitting nurse completed full skin assessment, Mayur score and Mayur interventions. This writer agrees with the initial skin assessment findings.

## 2020-09-12 ENCOUNTER — ANESTHESIA (OUTPATIENT)
Dept: SURGERY | Facility: CLINIC | Age: 60
End: 2020-09-12
Payer: COMMERCIAL

## 2020-09-12 VITALS
HEART RATE: 77 BPM | OXYGEN SATURATION: 97 % | TEMPERATURE: 97.5 F | BODY MASS INDEX: 25.86 KG/M2 | SYSTOLIC BLOOD PRESSURE: 134 MMHG | DIASTOLIC BLOOD PRESSURE: 77 MMHG | RESPIRATION RATE: 16 BRPM | WEIGHT: 160.94 LBS | HEIGHT: 66 IN

## 2020-09-12 PROCEDURE — 47562 LAPAROSCOPIC CHOLECYSTECTOMY: CPT | Mod: 22 | Performed by: SURGERY

## 2020-09-12 PROCEDURE — 37000008 ZZH ANESTHESIA TECHNICAL FEE, 1ST 30 MIN: Performed by: SURGERY

## 2020-09-12 PROCEDURE — 25000128 H RX IP 250 OP 636: Performed by: NURSE ANESTHETIST, CERTIFIED REGISTERED

## 2020-09-12 PROCEDURE — 25000125 ZZHC RX 250: Performed by: SURGERY

## 2020-09-12 PROCEDURE — 88304 TISSUE EXAM BY PATHOLOGIST: CPT | Performed by: SURGERY

## 2020-09-12 PROCEDURE — 27210794 ZZH OR GENERAL SUPPLY STERILE: Performed by: SURGERY

## 2020-09-12 PROCEDURE — 27110028 ZZH OR GENERAL SUPPLY NON-STERILE: Performed by: SURGERY

## 2020-09-12 PROCEDURE — 25000128 H RX IP 250 OP 636: Performed by: SURGERY

## 2020-09-12 PROCEDURE — 36000058 ZZH SURGERY LEVEL 3 EA 15 ADDTL MIN: Performed by: SURGERY

## 2020-09-12 PROCEDURE — G0378 HOSPITAL OBSERVATION PER HR: HCPCS

## 2020-09-12 PROCEDURE — 25000125 ZZHC RX 250: Performed by: NURSE ANESTHETIST, CERTIFIED REGISTERED

## 2020-09-12 PROCEDURE — 37000009 ZZH ANESTHESIA TECHNICAL FEE, EACH ADDTL 15 MIN: Performed by: SURGERY

## 2020-09-12 PROCEDURE — 25800030 ZZH RX IP 258 OP 636: Performed by: NURSE ANESTHETIST, CERTIFIED REGISTERED

## 2020-09-12 PROCEDURE — 25800030 ZZH RX IP 258 OP 636: Performed by: SURGERY

## 2020-09-12 PROCEDURE — 71000012 ZZH RECOVERY PHASE 1 LEVEL 1 FIRST HR: Performed by: SURGERY

## 2020-09-12 PROCEDURE — 88304 TISSUE EXAM BY PATHOLOGIST: CPT | Mod: 26 | Performed by: SURGERY

## 2020-09-12 PROCEDURE — 36000056 ZZH SURGERY LEVEL 3 1ST 30 MIN: Performed by: SURGERY

## 2020-09-12 PROCEDURE — 99214 OFFICE O/P EST MOD 30 MIN: CPT | Mod: 57 | Performed by: SURGERY

## 2020-09-12 PROCEDURE — 25000566 ZZH SEVOFLURANE, EA 15 MIN: Performed by: SURGERY

## 2020-09-12 RX ORDER — PHENYLEPHRINE HYDROCHLORIDE 10 MG/ML
INJECTION INTRAVENOUS PRN
Status: DISCONTINUED | OUTPATIENT
Start: 2020-09-12 | End: 2020-09-12

## 2020-09-12 RX ORDER — DIMENHYDRINATE 50 MG/ML
25 INJECTION, SOLUTION INTRAMUSCULAR; INTRAVENOUS
Status: DISCONTINUED | OUTPATIENT
Start: 2020-09-12 | End: 2020-09-12 | Stop reason: HOSPADM

## 2020-09-12 RX ORDER — BUPIVACAINE HYDROCHLORIDE AND EPINEPHRINE 2.5; 5 MG/ML; UG/ML
INJECTION, SOLUTION INFILTRATION; PERINEURAL PRN
Status: DISCONTINUED | OUTPATIENT
Start: 2020-09-12 | End: 2020-09-12 | Stop reason: HOSPADM

## 2020-09-12 RX ORDER — DEXAMETHASONE SODIUM PHOSPHATE 4 MG/ML
INJECTION, SOLUTION INTRA-ARTICULAR; INTRALESIONAL; INTRAMUSCULAR; INTRAVENOUS; SOFT TISSUE PRN
Status: DISCONTINUED | OUTPATIENT
Start: 2020-09-12 | End: 2020-09-12

## 2020-09-12 RX ORDER — OXYCODONE HYDROCHLORIDE 5 MG/1
5 TABLET ORAL
Status: DISCONTINUED | OUTPATIENT
Start: 2020-09-12 | End: 2020-09-12 | Stop reason: HOSPADM

## 2020-09-12 RX ORDER — PROPOFOL 10 MG/ML
INJECTION, EMULSION INTRAVENOUS PRN
Status: DISCONTINUED | OUTPATIENT
Start: 2020-09-12 | End: 2020-09-12

## 2020-09-12 RX ORDER — METOPROLOL TARTRATE 1 MG/ML
1-2 INJECTION, SOLUTION INTRAVENOUS EVERY 5 MIN PRN
Status: DISCONTINUED | OUTPATIENT
Start: 2020-09-12 | End: 2020-09-12 | Stop reason: HOSPADM

## 2020-09-12 RX ORDER — OXYCODONE HYDROCHLORIDE 5 MG/1
5 TABLET ORAL EVERY 6 HOURS PRN
Qty: 12 TABLET | Refills: 0 | Status: SHIPPED | OUTPATIENT
Start: 2020-09-12 | End: 2024-08-21

## 2020-09-12 RX ORDER — NALOXONE HYDROCHLORIDE 0.4 MG/ML
.1-.4 INJECTION, SOLUTION INTRAMUSCULAR; INTRAVENOUS; SUBCUTANEOUS
Status: DISCONTINUED | OUTPATIENT
Start: 2020-09-12 | End: 2020-09-12 | Stop reason: HOSPADM

## 2020-09-12 RX ORDER — LIDOCAINE HYDROCHLORIDE 10 MG/ML
INJECTION, SOLUTION INFILTRATION; PERINEURAL PRN
Status: DISCONTINUED | OUTPATIENT
Start: 2020-09-12 | End: 2020-09-12

## 2020-09-12 RX ORDER — GLYCOPYRROLATE 0.2 MG/ML
INJECTION, SOLUTION INTRAMUSCULAR; INTRAVENOUS PRN
Status: DISCONTINUED | OUTPATIENT
Start: 2020-09-12 | End: 2020-09-12

## 2020-09-12 RX ORDER — ONDANSETRON 2 MG/ML
4 INJECTION INTRAMUSCULAR; INTRAVENOUS EVERY 30 MIN PRN
Status: DISCONTINUED | OUTPATIENT
Start: 2020-09-12 | End: 2020-09-12 | Stop reason: HOSPADM

## 2020-09-12 RX ORDER — GABAPENTIN 300 MG/1
300 CAPSULE ORAL ONCE
Status: DISCONTINUED | OUTPATIENT
Start: 2020-09-12 | End: 2020-09-12 | Stop reason: HOSPADM

## 2020-09-12 RX ORDER — ALBUTEROL SULFATE 0.83 MG/ML
2.5 SOLUTION RESPIRATORY (INHALATION) EVERY 4 HOURS PRN
Status: DISCONTINUED | OUTPATIENT
Start: 2020-09-12 | End: 2020-09-12 | Stop reason: HOSPADM

## 2020-09-12 RX ORDER — KETOROLAC TROMETHAMINE 30 MG/ML
INJECTION, SOLUTION INTRAMUSCULAR; INTRAVENOUS PRN
Status: DISCONTINUED | OUTPATIENT
Start: 2020-09-12 | End: 2020-09-12

## 2020-09-12 RX ORDER — LIDOCAINE 40 MG/G
CREAM TOPICAL
Status: DISCONTINUED | OUTPATIENT
Start: 2020-09-12 | End: 2020-09-12 | Stop reason: HOSPADM

## 2020-09-12 RX ORDER — SODIUM CHLORIDE, SODIUM LACTATE, POTASSIUM CHLORIDE, CALCIUM CHLORIDE 600; 310; 30; 20 MG/100ML; MG/100ML; MG/100ML; MG/100ML
INJECTION, SOLUTION INTRAVENOUS CONTINUOUS PRN
Status: DISCONTINUED | OUTPATIENT
Start: 2020-09-12 | End: 2020-09-12

## 2020-09-12 RX ORDER — SODIUM CHLORIDE, SODIUM LACTATE, POTASSIUM CHLORIDE, CALCIUM CHLORIDE 600; 310; 30; 20 MG/100ML; MG/100ML; MG/100ML; MG/100ML
INJECTION, SOLUTION INTRAVENOUS CONTINUOUS
Status: DISCONTINUED | OUTPATIENT
Start: 2020-09-12 | End: 2020-09-12 | Stop reason: HOSPADM

## 2020-09-12 RX ORDER — ACETAMINOPHEN 325 MG/1
975 TABLET ORAL ONCE
Status: DISCONTINUED | OUTPATIENT
Start: 2020-09-12 | End: 2020-09-12 | Stop reason: HOSPADM

## 2020-09-12 RX ORDER — ONDANSETRON 2 MG/ML
INJECTION INTRAMUSCULAR; INTRAVENOUS PRN
Status: DISCONTINUED | OUTPATIENT
Start: 2020-09-12 | End: 2020-09-12

## 2020-09-12 RX ORDER — FENTANYL CITRATE 50 UG/ML
25-50 INJECTION, SOLUTION INTRAMUSCULAR; INTRAVENOUS
Status: DISCONTINUED | OUTPATIENT
Start: 2020-09-12 | End: 2020-09-12 | Stop reason: HOSPADM

## 2020-09-12 RX ORDER — FENTANYL CITRATE 50 UG/ML
INJECTION, SOLUTION INTRAMUSCULAR; INTRAVENOUS PRN
Status: DISCONTINUED | OUTPATIENT
Start: 2020-09-12 | End: 2020-09-12

## 2020-09-12 RX ORDER — MEPERIDINE HYDROCHLORIDE 25 MG/ML
12.5 INJECTION INTRAMUSCULAR; INTRAVENOUS; SUBCUTANEOUS EVERY 5 MIN PRN
Status: DISCONTINUED | OUTPATIENT
Start: 2020-09-12 | End: 2020-09-12 | Stop reason: HOSPADM

## 2020-09-12 RX ORDER — ONDANSETRON 4 MG/1
4 TABLET, ORALLY DISINTEGRATING ORAL EVERY 30 MIN PRN
Status: DISCONTINUED | OUTPATIENT
Start: 2020-09-12 | End: 2020-09-12 | Stop reason: HOSPADM

## 2020-09-12 RX ADMIN — GLYCOPYRROLATE 0.1 MG: 0.2 INJECTION, SOLUTION INTRAMUSCULAR; INTRAVENOUS at 08:09

## 2020-09-12 RX ADMIN — PROPOFOL 200 MG: 10 INJECTION, EMULSION INTRAVENOUS at 08:09

## 2020-09-12 RX ADMIN — DEXTROSE AND SODIUM CHLORIDE: 5; 900 INJECTION, SOLUTION INTRAVENOUS at 02:56

## 2020-09-12 RX ADMIN — ROCURONIUM BROMIDE 30 MG: 10 INJECTION INTRAVENOUS at 08:09

## 2020-09-12 RX ADMIN — MIDAZOLAM 2 MG: 1 INJECTION INTRAMUSCULAR; INTRAVENOUS at 08:06

## 2020-09-12 RX ADMIN — DEXAMETHASONE SODIUM PHOSPHATE 8 MG: 4 INJECTION, SOLUTION INTRA-ARTICULAR; INTRALESIONAL; INTRAMUSCULAR; INTRAVENOUS; SOFT TISSUE at 08:09

## 2020-09-12 RX ADMIN — PHENYLEPHRINE HYDROCHLORIDE 100 MCG: 10 INJECTION INTRAVENOUS at 08:55

## 2020-09-12 RX ADMIN — PHENYLEPHRINE HYDROCHLORIDE 100 MCG: 10 INJECTION INTRAVENOUS at 09:01

## 2020-09-12 RX ADMIN — PHENYLEPHRINE HYDROCHLORIDE 100 MCG: 10 INJECTION INTRAVENOUS at 08:42

## 2020-09-12 RX ADMIN — HYDROMORPHONE HYDROCHLORIDE 0.5 MG: 1 INJECTION, SOLUTION INTRAMUSCULAR; INTRAVENOUS; SUBCUTANEOUS at 10:01

## 2020-09-12 RX ADMIN — LIDOCAINE HYDROCHLORIDE 100 MG: 10 INJECTION, SOLUTION INFILTRATION; PERINEURAL at 08:09

## 2020-09-12 RX ADMIN — KETOROLAC TROMETHAMINE 30 MG: 30 INJECTION, SOLUTION INTRAMUSCULAR at 09:15

## 2020-09-12 RX ADMIN — TAZOBACTAM SODIUM AND PIPERACILLIN SODIUM 3.38 G: 375; 3 INJECTION, SOLUTION INTRAVENOUS at 08:34

## 2020-09-12 RX ADMIN — GLYCOPYRROLATE 0.1 MG: 0.2 INJECTION, SOLUTION INTRAMUSCULAR; INTRAVENOUS at 08:06

## 2020-09-12 RX ADMIN — FENTANYL CITRATE 150 MCG: 50 INJECTION, SOLUTION INTRAMUSCULAR; INTRAVENOUS at 08:09

## 2020-09-12 RX ADMIN — FENTANYL CITRATE 100 MCG: 50 INJECTION, SOLUTION INTRAMUSCULAR; INTRAVENOUS at 08:24

## 2020-09-12 RX ADMIN — PHENYLEPHRINE HYDROCHLORIDE 100 MCG: 10 INJECTION INTRAVENOUS at 08:33

## 2020-09-12 RX ADMIN — TAZOBACTAM SODIUM AND PIPERACILLIN SODIUM 3.38 G: 375; 3 INJECTION, SOLUTION INTRAVENOUS at 02:56

## 2020-09-12 RX ADMIN — SODIUM CHLORIDE, POTASSIUM CHLORIDE, SODIUM LACTATE AND CALCIUM CHLORIDE: 600; 310; 30; 20 INJECTION, SOLUTION INTRAVENOUS at 09:18

## 2020-09-12 RX ADMIN — ONDANSETRON 4 MG: 2 INJECTION INTRAMUSCULAR; INTRAVENOUS at 09:15

## 2020-09-12 RX ADMIN — SODIUM CHLORIDE, POTASSIUM CHLORIDE, SODIUM LACTATE AND CALCIUM CHLORIDE: 600; 310; 30; 20 INJECTION, SOLUTION INTRAVENOUS at 08:06

## 2020-09-12 NOTE — PROGRESS NOTES
[facility] DISCHARGE NOTE    Patient discharged to home at 2:15 PM via wheel chair. Accompanied by spouse and staff. Discharge instructions reviewed with patient and spouse, opportunity offered to ask questions. Prescriptions sent to patients preferred pharmacy. All belongings sent with patient.    Christiana Gordon RN

## 2020-09-12 NOTE — PLAN OF CARE
S:  Patient inpatient at [facility]    B:  Patient admitted 9/11 for calculus of gallbladder    A:  Vitals stable\  Denies pain  Denies nausea  NPO since 9/11 2330  Up independent in room    R:  Patient rested in room for NOC shift     Patricio Hogan RN

## 2020-09-12 NOTE — PLAN OF CARE
"Patient alert/oriented.  Independent.  Patient ambulating in her room, steady. Patient c/o slight nausea after dinner but declined anti-emetic.  No vomiting, no c/o pain.  Patient will be NPO at midnight.  Surgery at 8 AM tomorrow.  Patient resting comfortably.  /72 (BP Location: Right arm)   Pulse 66   Temp 98.3  F (36.8  C) (Oral)   Resp 16   Ht 1.676 m (5' 5.98\")   Wt 73 kg (160 lb 15 oz)   SpO2 97%   BMI 25.99 kg/m    Colleen Day RN on 9/11/2020 at 9:05 PM    "

## 2020-09-12 NOTE — DISCHARGE INSTRUCTIONS
[facility]    Home Care Following Gallbladder Surgery    [facility]    Pain meds:     600mg ibuprofen every 6hrs prn     650mg of acetaminophen every 6hrs prn    You can alternate these meds so that you take one every 3 hrs.      Make sure you do not go over 4000mg of acetaminophen every 24hrs, especially if you are taking Norco or percocet 5/325mg.    Care of the Incision:    Remove gauze dressing (if present) after 48 hours.    Leave small strips in place (if present).  They will gradually come off.    If surgical glue was used on your incision, keep it dry for 24 hours.  Then you may shower but don t submerge under water for at least 2 weeks.  Gently pat your incision dry with a freshly laundered towel.    Do not touch your incision with bare hands or pick at scabs.    Leave your incision open to air.  Cover it only if draining, clothing rubs or irritates it.    Activity:    Gradually increase your activity.  Walk short distances several times each day and increase the distance as your strength allows.    To promote circulation, do not cross your legs while sitting.    No strenuous lifting or straining for 2-3 weeks.  Do not lift anything over 10-20 pounds until your doctor approves an increase.    Return to work will be determined by the type of work you do and should be discussed with your physician.    Do not drive or operate equipment while taking prescription pain medicines.  You may drive 1 week after surgery if you have stopped taking prescription pain medicines and can react quickly enough to make an emergency stop if necessary.    Diet:    Return to the diet you were on before surgery.    Drink plenty of water.    Avoid foods that cause constipation.      REMEMBER--most prescription pain pills cause constipation.  Walking, extra fluids, and increased fiber (fresh fruits and vegetables, etc.) are natural remedies for constipation.  You can also take mineral oil, 1-2 Tablespoons per  day.  If still constipated you may try a stool softener such as Colace or Miralax.    Call Your Physician if You Have:    Redness, increased swelling or cloudy drainage from your incision.    A temperature of more than 101 degrees F.    Worsening pain in your incision not relieved by your prescription pain pills and/or a short rest.    Jaundice (yellowing of skin or eyes)    Abdominal distention (stomach getting very large)    Swelling in your legs    Productive cough    Burning with urination    Any questions or concerns about your recovery, please call     Business hours [phone]    After hours [phone] Nurse Advice Line (24 hours a day)    Follow-up Care:    Make an appointment 2-3 week after your surgery.  Call [phone].

## 2020-09-12 NOTE — ANESTHESIA POSTPROCEDURE EVALUATION
Patient: Kindra Herbert    Procedure(s):  CHOLECYSTECTOMY, LAPAROSCOPIC WITH LYSIS OF ADHESIONS    Diagnosis:Calculus of gallbladder with acute cholecystitis without obstruction [K80.00]  Diagnosis Additional Information: No value filed.    Anesthesia Type:  General    Note:  Anesthesia Post Evaluation    Patient location during evaluation: Floor  Patient participation: Able to fully participate in evaluation  Level of consciousness: awake and alert  Pain management: adequate  Airway patency: patent  Cardiovascular status: acceptable and hemodynamically stable  Respiratory status: acceptable, room air and spontaneous ventilation  Hydration status: acceptable  PONV: none     Anesthetic complications: None          Last vitals:  Vitals:    09/12/20 1000 09/12/20 1015 09/12/20 1027   BP: 126/77 (!) 142/92    Pulse: 69 68    Resp: 16 16    Temp:      SpO2: 96% 100% 100%         Electronically Signed By: JUAQUIN Cuello CRNA  September 12, 2020  10:47 AM

## 2020-09-12 NOTE — PROGRESS NOTES
"GENERAL SURGERY PROGRESS NOTE    Name:  Kindra Herbert Date/Time of Admission: 2020  4:32 AM   CSN: [id number]  Attending Provider: Vipin Castro MD   Room/Bed:  2407/2407-01 : 1960 60 year old       2020  HD #1    SUBJECTIVE:     Patient seen and examined.  Not currently having pain.  No neg; neg vomiting; been NPO.      Current Meds:  Scheduled Meds:    piperacillin-tazobactam  3.375 g Intravenous Q6H     sodium chloride (PF)  3 mL Intracatheter Q8H     Continuous Infusions:    dextrose 5% and 0.9% NaCl 100 mL/hr at 20 0256     PRN Meds:.acetaminophen, HYDROmorphone, lidocaine 4%, lidocaine (buffered or not buffered), melatonin, naloxone, ondansetron **OR** ondansetron, prochlorperazine **OR** prochlorperazine **OR** prochlorperazine, sodium chloride (PF)     OBJECTIVE:     Vital Signs:  BP 90/50 (BP Location: Left arm)   Pulse 54   Temp 98.4  F (36.9  C) (Oral)   Resp 16   Ht 1.676 m (5' 5.98\")   Wt 73 kg (160 lb 15 oz)   SpO2 99%   BMI 25.99 kg/m       I/O:    I/O last 3 completed shifts:  In: 50 [IV Piggyback:50]  Out: -    No intake/output data recorded.      PHYSICAL EXAMINATION   General  Alert, cooperative, NAD   Lungs CTAB, normal chest wall excursion, no rhonchi, rales, or wheezing   Heart RRR   Abdominal Soft, nontender, nondistended, no involuntary guarding or rebound appreciated    Extremities No edema or gross deformities noted appreciated    Incisions n/a     Labs:   Last CBC w diff Results:  Recent Labs   Lab 20   WBC 7.0   HCT 42.8      RBC 5.18   MCV 83   MCHC 31.8   MCH 26.3*   RDW 14.4    Last Renal Function Results:  Recent Labs   Lab 20      CO2 28   BUN 17   ALBUMIN 4.1                Last Liver Function/Coags/Cardiac Results:  Recent Labs   Lab 20  0443   ALT 29   AST 22   BILITOTAL 0.4   ALKPHOS 89   LIPASE 110    Microbiology:  [unfilled]   [unfilled]      Imaging Studies: [unfilled]    ASSESSMENT:     Principal " Problem:    Calculus of gallbladder with acute cholecystitis without obstruction  Active Problems:    Gallstones      PLAN:     The patient was thoroughly counseled regarding their biliary colic 2/2 chronic calculus cholecystitis.    The patient was informed that the proposed procedure or medical intervention involves removal of the gallbladder laparoscopically (with small incisions and camera) possibly open and does offer a very good likelihood of symptom relief.     The patient was made aware of the risks of the procedure, including but not limited to:    bleeding, conversion to open, bile leak, bile duct injury or other adjacent organ injury, retained gallstones, cardiac or pulmonary related complications and anesthesia complications. Also that difficulties may be encountered during recovery to include: wound or deep intraabdominal infection, wound dehiscence, incisional hernia, bile leak or retained stone requiring ERCP.     In the course of the evaluation we did discuss other therapeutic options with the patient, including antibiotics and/or drainage. The risks and benefits of these options were also discussed which include but are not limited to: recurrent worsening episodes.     Also discussed were possible problems or difficulties the patient may encounter if treatment was not pursued at this time. These include: worsening episodes, cholangitis and/or pancreatitis.     The [facility] was informed [facility] will be primarily responsible for the procedure. Assistance during the procedure and during hospitalization may also be provided by other physicians, nurses and technicians.     The patient was also informed that if exposure to the patient s blood or body fluids occurs during the procedure, HIV testing of the patient will occur unless they refuse at this time. Risk of blood transfusion is minimal.     The patient will be provided additional education resources by the support staff. If there are  ever any questions regarding their diagnosis or the procedure, the patient is encouraged to ask.     All of the patient s or their legal representative s questions have been answered to their satisfaction and they have indicated a clear understanding of this discussion.   Kindra expressed understanding of risks, benefits and alternatives and wished to proceed.     All findings, test results, and diagnosis were discussed with the patient. Kindra  participated in the decision making process and agreed with the plan of care. Questions were sought and answered.       Time talking to patient including looking up records and talking to other physicians and nurses about patient care is  25 .mins  Electronically signed by:  Aye Munoz MD  9/12/2020

## 2020-09-12 NOTE — ANESTHESIA CARE TRANSFER NOTE
Patient: Kindra Herbert    Procedure(s):  CHOLECYSTECTOMY, LAPAROSCOPIC WITH LYSIS OF ADHESIONS    Diagnosis: Calculus of gallbladder with acute cholecystitis without obstruction [K80.00]  Diagnosis Additional Information: No value filed.    Anesthesia Type:   General     Note:  Airway :Nasal Cannula  Patient transferred to:PACU  Handoff Report: Identifed the Patient, Identified the Reponsible Provider, Reviewed the pertinent medical history, Discussed the surgical course, Reviewed Intra-OP anesthesia mangement and issues during anesthesia, Set expectations for post-procedure period and Allowed opportunity for questions and acknowledgement of understanding      Vitals: (Last set prior to Anesthesia Care Transfer)    CRNA VITALS  9/12/2020 0904 - 9/12/2020 1004      9/12/2020             Pulse:  101    SpO2:  98 %                Electronically Signed By: JUAQUIN Cuello CRNA  September 12, 2020  10:45 AM

## 2020-09-12 NOTE — OP NOTE
OPERATIVE NOTE  [facility] SURGERY    DATE:  9/12/2020    SURGEON:  Aye Munoz DO    ASSISTANT:  Single scrub    PREOPERATIVE DIAGNOSIS:  Calculus of gallbladder with acute cholecystitis without obstruction [K80.00]    POSTOPERATIVE DIAGNOSIS:  Acute on chronic calculus cholecystitis  Intra-abdominal adhesions      OPERATION:  Laparoscopic cholecystectomy with lysis of adhesions - modifier 22 .    ANESTHESIA:  General endotracheal.    INDICATIONS FOR PROCEDURE:  The patient is a 60 year old year old female with 60 y.o with epigastric pain and symptoms of biliary colic.  Based on this, laparoscopic cholecystectomy was recommended.    FINDINGS:  Acute on chronic calculus cholecystitis  Intraabdominal adhesions from previous surgeries and also adhesions between the pre-pyloric to the fundus of the gallbladder - freeing these adhesions added 30 mins to our operative time.      PROCEDURE IN DETAIL: The patient was preoperatively identified. Consent was signed and placed on the chart. She/he was brought back to the operative suite where he was laid supine on the operating table. General endotracheal anesthesia was induced per anesthesia protocol. She/he was then prepped and draped in sterile fashion. We started by infiltrating 5 cc of local anesthetic in the right upper quadrant area and made small skin incision and accessing the abdominal cavity by using Optiview trocar and 5-mm trocar site visualizing all layers of the abdominal wall until we reached the peritoneal cavity. We then insufflated  with CO2 gas to create pneumoperitoneum. A second right subcostal port was inserted, also 5 mm, as well as subxiphoid at supraumbilical site. All were 5 mm ports except the subxiphoid. All were inserted under direct visualization and all sites were preanesthetized with local anesthetic prior to the insertion of the trocar. It was noted pt had omental adhesions at the mid Right upper quadrant; these had to be taken down as  it was in the way of our inferior subcostal port.  These were taken down with the hook cautery.  We then grabbed the fundus of the gallbladder, retracted it anteriorly and cephalad. There were large number of adhesions to the greater omentum, and portion of the prepyloric well adherent to the fundus and the infundibular of the gallbladder.  These were taken down sharply and bluntly with gently dissection.  After we had successfully taken down these adhesions, we then grabbed the neck of the gallbladder, retracted it laterally, and undertook dissection of the Calot's triangle. It was noted there was edematous fluid throughout the triangle of Calot and the liver fossa.  We identified the duct and artery, skeletonized the structures, clipped them proximally and distally, and ligated them with EndoShears. At this point, we took the gallbladder off of the liver bed using Bovie electrosurgery. After the gallbladder had been completely liberated from the liver bed, we removed it from the abdomen using an Endopouch. I looked at the bowel that was taken down and noted no bowel injury.  Hemostasis was noted to be excellent at the conclusion of the case. We closed the 12mm trocar transfascially with a 0-vicryl on a UR6. All irrigant that could be visualized was removed from the abdomen. We then desufflated the abdomen, reduced pneumoperitoneum, and removed the trocars under direct visualization. We then undertook skin closure with interrupted Monocryl sutures in subcuticular fashion. The patient tolerated the procedure well and was brought to PACU in stable condition.    COMPLICATIONS: None.    ESTIMATED BLOOD LOSS: 5cc    SPECIMENS: Gallbladder.    DISPOSITION: Stable to PACU with anticipated discharge home later today.    [facility], DO

## 2020-09-12 NOTE — PROGRESS NOTES
Patient lying back in bed after voiding in the bathroom. X4 punctures sites to abdomen. Slight bruising but otherwise edges approximated and the incisions look good. No visible s/s of infection or irritation. Bowel sounds are hypoactive but present. Patient states she feels more pressure than pain. Encouraged to walk.

## 2020-09-12 NOTE — PROGRESS NOTES
Patient arriving back to the floor at this time. Ambulated to the bed. In the bathroom currently. Appears to be doing well, no complaints.

## 2020-09-15 LAB — COPATH REPORT: NORMAL

## 2020-09-15 NOTE — DISCHARGE SUMMARY
"[facility] Discharge Summary    Kindra Herbert MRN# [id number]   Age: 60 year old YOB: 1960     Date of Admission:  9/11/2020  Date of Discharge::  9/14/2020   Admitting Physician:  Vipin Castro MD  Discharge Physician:  Aye Munoz MD    Primary doctor:   Cassandra [facility]    Admission Diagnoses:  Calculus of gallbladder with acute cholecystitis without obstruction [K80.00]        Discharge Diagnoses:  Principal Problem:    Calculus of gallbladder with acute cholecystitis without obstruction    Active Problems:    Gallstones          Procedures:  Lap aidee     Medications prior to Admission:  No medications prior to admission.         Medications on Discharge:  Discharge Medication List as of 9/12/2020  1:44 PM      START taking these medications    Details   oxyCODONE (ROXICODONE) 5 MG tablet Take 1 tablet (5 mg) by mouth every 6 hours as needed for moderate to severe pain, Disp-12 tablet,R-0, E-Prescribe         CONTINUE these medications which have NOT CHANGED    Details   Diphenoxylate-Atropine (LOMOTIL PO) Take 1 tablet by mouth 4 times daily as needed , Historical      Ibuprofen (ADVIL PO) Take 600 mg by mouth daily as needed for moderate pain, Historical      LISINOPRIL PO Take 5 mg by mouth daily, Historical               Consultations:  No consultations were requested during this admission    Brief History of Illness:  The patient is a 60 year old year old female with 60 y.o with epigastric pain and symptoms of biliary colic.  Based on this, laparoscopic cholecystectomy was recommended.    Hospital Course:  Patient was hospitalized overnight for epigastric pain signs and symptoms of recurrent biliary colic.  Thus the surgery was done the following day.  Patient went home on the day of surgery.    Discharge Exam:    /77 (BP Location: Left arm)   Pulse 77   Temp 97.5  F (36.4  C) (Oral)   Resp 16   Ht 1.676 m (5' 5.98\")   Wt 73 kg (160 lb 15 oz)   SpO2 97%   " BMI 25.99 kg/m      GENERAL APPEARANCE: healthy, alert and no distress  ABDOMEN: soft, nontender, without hepatosplenomegaly or masses and bowel sounds normal      Attestation:  I have reviewed today's vital signs, notes, medications, labs and imaging.  Amount of time performed on this discharge summary: 15 minutes.    [facility] Munoz MD

## 2020-09-22 NOTE — ED PROVIDER NOTES
Care assumed at shift transition awaiting results of RUQ US, which showed gallstone in neck of gallbladder. Results and options reviewed with patient. Unlikely to pass stone of this size. Surgery consulted and patient will go to OR for cholecystectomy.      Patrice Wen MD  09/22/20 [id number]

## 2020-10-01 ENCOUNTER — OFFICE VISIT (OUTPATIENT)
Dept: SURGERY | Facility: CLINIC | Age: 60
End: 2020-10-01
Payer: COMMERCIAL

## 2020-10-01 VITALS
SYSTOLIC BLOOD PRESSURE: 104 MMHG | DIASTOLIC BLOOD PRESSURE: 60 MMHG | TEMPERATURE: 96.6 F | BODY MASS INDEX: 25.89 KG/M2 | WEIGHT: 160.3 LBS

## 2020-10-01 DIAGNOSIS — Z90.49 S/P LAPAROSCOPIC CHOLECYSTECTOMY: Primary | ICD-10-CM

## 2020-10-01 PROCEDURE — 99024 POSTOP FOLLOW-UP VISIT: CPT | Performed by: SURGERY

## 2020-10-01 NOTE — LETTER
10/1/2020         RE: Kindra Herbert  [address]  [place] [facility] [place] [address]        Dear Colleague,    Thank you for referring your patient, Kindra Herbert, to the [facility]. Please see a copy of my visit note below.    [facility] FOLLOW-UP NOTE  GENERAL SURGERY    PCP: Cassandra [facility]         Assessment and Plan:   Assessment:   Kindra Herbert is a 60 year old female who presented post operatively from lap aidee on 9/12/2020  and is doing very well.       ICD-10-CM    1. S/P laparoscopic cholecystectomy  Z90.49        I reviewed the pathology report today with the patient and answered all questions.  SPECIMEN(S):   Gallbladder and contents     FINAL DIAGNOSIS:   Gallbladder, laparoscopic cholecystectomy:   - Calculous chronic cholecystitis.     Plan:  -No lifting > 20 lbs for 4 weeks from DOS  -no work note needed  -follow up as needed           Subjective:     Kindra Herbert is a 60 year old female who presents post operatively from lap aidee on 9/12/2020. Pain has been controled. Currently is not using pain medications. Eating a Regular and having regular bladder/bowel function. Overall doing very well.           Objective:       /60   Temp 96.6  F (35.9  C) (Temporal)   Wt 72.7 kg (160 lb 4.8 oz)   BMI 25.89 kg/m     Constitutional: Awake, alert, in no acute distress.  Respiratory: Non-labored.   Cardiovascular: Regular rate and rhythm.   Abdomen: soft. Incision is Healing well.    JaysonRocio Munoz DO  [facility]                  Again, thank you for allowing me to participate in the care of your patient.        Sincerely,        Aye Munoz MD

## 2020-10-07 NOTE — PROGRESS NOTES
[facility] FOLLOW-UP NOTE  GENERAL SURGERY    PCP: Cassandra [facility]         Assessment and Plan:   Assessment:   Kindra Herbert is a 60 year old female who presented post operatively from lap aidee on 9/12/2020  and is doing very well.       ICD-10-CM    1. S/P laparoscopic cholecystectomy  Z90.49        I reviewed the pathology report today with the patient and answered all questions.  SPECIMEN(S):   Gallbladder and contents     FINAL DIAGNOSIS:   Gallbladder, laparoscopic cholecystectomy:   - Calculous chronic cholecystitis.     Plan:  -No lifting > 20 lbs for 4 weeks from DOS  -no work note needed  -follow up as needed           Subjective:     Kindra Herbert is a 60 year old female who presents post operatively from lap aidee on 9/12/2020. Pain has been controled. Currently is not using pain medications. Eating a Regular and having regular bladder/bowel function. Overall doing very well.           Objective:       /60   Temp 96.6  F (35.9  C) (Temporal)   Wt 72.7 kg (160 lb 4.8 oz)   BMI 25.89 kg/m     Constitutional: Awake, alert, in no acute distress.  Respiratory: Non-labored.   Cardiovascular: Regular rate and rhythm.   Abdomen: soft. Incision is Healing well.    [facility] Munoz, DO  [facility]

## 2020-11-16 ENCOUNTER — HEALTH MAINTENANCE LETTER (OUTPATIENT)
Age: 60
End: 2020-11-16

## 2021-09-18 ENCOUNTER — HEALTH MAINTENANCE LETTER (OUTPATIENT)
Age: 61
End: 2021-09-18

## 2022-01-08 ENCOUNTER — HEALTH MAINTENANCE LETTER (OUTPATIENT)
Age: 62
End: 2022-01-08

## 2022-11-19 ENCOUNTER — HEALTH MAINTENANCE LETTER (OUTPATIENT)
Age: 62
End: 2022-11-19

## 2023-04-09 ENCOUNTER — HEALTH MAINTENANCE LETTER (OUTPATIENT)
Age: 63
End: 2023-04-09

## 2024-04-26 ENCOUNTER — TRANSCRIBE ORDERS (OUTPATIENT)
Dept: OTHER | Age: 64
End: 2024-04-26

## 2024-04-26 DIAGNOSIS — Z98.890 H/O PELVIC SURGERY: ICD-10-CM

## 2024-04-26 DIAGNOSIS — N94.10 DYSPAREUNIA IN FEMALE: Primary | ICD-10-CM

## 2024-08-21 ENCOUNTER — OFFICE VISIT (OUTPATIENT)
Dept: UROLOGY | Facility: CLINIC | Age: 64
End: 2024-08-21
Attending: INTERNAL MEDICINE
Payer: COMMERCIAL

## 2024-08-21 VITALS
DIASTOLIC BLOOD PRESSURE: 78 MMHG | WEIGHT: 166 LBS | SYSTOLIC BLOOD PRESSURE: 131 MMHG | BODY MASS INDEX: 26.68 KG/M2 | HEIGHT: 66 IN | HEART RATE: 68 BPM

## 2024-08-21 DIAGNOSIS — N81.11 CYSTOCELE, MIDLINE: ICD-10-CM

## 2024-08-21 DIAGNOSIS — N95.2 VAGINAL ATROPHY: ICD-10-CM

## 2024-08-21 DIAGNOSIS — N94.10 DYSPAREUNIA IN FEMALE: Primary | ICD-10-CM

## 2024-08-21 PROCEDURE — 99213 OFFICE O/P EST LOW 20 MIN: CPT | Performed by: OBSTETRICS & GYNECOLOGY

## 2024-08-21 PROCEDURE — 99204 OFFICE O/P NEW MOD 45 MIN: CPT | Performed by: OBSTETRICS & GYNECOLOGY

## 2024-08-21 RX ORDER — PROGESTERONE 100 MG/1
200 CAPSULE ORAL AT BEDTIME
COMMUNITY

## 2024-08-21 RX ORDER — ESTRADIOL 0.1 MG/G
1 CREAM VAGINAL
Qty: 42.5 G | Refills: 3 | Status: SHIPPED | OUTPATIENT
Start: 2024-08-21

## 2024-08-21 RX ORDER — POTASSIUM CHLORIDE 750 MG/1
30 CAPSULE, EXTENDED RELEASE ORAL
COMMUNITY
Start: 2024-08-01

## 2024-08-21 RX ORDER — CHLORTHALIDONE 25 MG/1
0.5 TABLET ORAL DAILY
COMMUNITY
Start: 2024-03-01

## 2024-08-21 NOTE — PROGRESS NOTES
2024    Referring Provider: Lisa Anderson  [address] [place] [id number]    Primary Care Provider: Cassandra, [facility]    CC: pain with intercourse     HPI:  Kindra Herbert is a 64 year old  with med hx sig for  anxiety, cHTN, kidney stones, IBS  who presents for evaluation of pain with intercourse that started after her pelvic floor reconstruction surgery for vaginal vault prolapse in . She is s/p uterosacral ligament suspension and modified Gregorio Kumar culdoplasty, anterior and posterior vaginal repairs, and cystoscopy. ( Alligna 20: ) . She says she feels a tearing type pain with intercourse. She has done some pelvic PT and tried vaginal estrace cream early on when this started and says did not help.       Urinary Symptoms/Voiding function  Denies bothersome stress or urgency leakage. Denies urinary urgency, frequency, nocturia. Denies hx of recurrent UTI or gross hematuria. Denies difficulty with voiding     Pelvic Organ Prolapse Symptoms  Denies vaginal bulge, pressure sensation or protrusion.    Gastrointestinal Symptoms:  Has occasional diarrhea ( hx of IBS) that has improved after cholecystectomy but can still be triggered occasionally with anxiety. No constipation or fecal incontinence. No defecatory issues.     Sexual function/Pelvic floor pain/GYN:   Sexually active. Pain with penetration/deep trust since her pelvic floor surgery in       Relevant Medical History:    Diabetes? no  High Blood pressure? yes     Recurrent UTIs? no  Sleep Apnea? no  Obesity? Body mass index is 26.79 kg/m .  History of Blood clots? no  Other medical problems: as abo ve    Surgical History:      Past Surgical History:   Procedure Laterality Date    HYSTERECTOMY      LAPAROSCOPIC CHOLECYSTECTOMY N/A 2020    Procedure: CHOLECYSTECTOMY, LAPAROSCOPIC WITH LYSIS OF ADHESIONS;  Surgeon: Aye Munoz MD;  Location: [facility] OR       OB/Gyn History:  OB History   No obstetric history on file.        Medications/Vitamins/Supplements:   Current Outpatient Medications   Medication Sig Dispense Refill    chlorthalidone (HYGROTON) 25 MG tablet Take 0.5 tablets by mouth daily.      estradiol (ESTRACE) 0.1 MG/GM vaginal cream Place 1 g vaginally three times a week. Ok to use your fingers or the applicator 42.5 g 3    potassium chloride ER (MICRO-K) 10 MEQ CR capsule Take 30 mEq by mouth.      progesterone (PROMETRIUM) 100 MG capsule Take 200 mg by mouth at bedtime.       No current facility-administered medications for this visit.         Medical History:      Past Medical History:   Diagnosis Date    History of laparoscopy 1987    History of total vaginal hysterectomy 2006    Right kidney stone 2014    laser lithotripsy with stent placement    Vaginal vault prolapse, posthysterectomy      ROS  Social History    Social History     Socioeconomic History    Marital status:      Spouse name: Not on file    Number of children: Not on file    Years of education: Not on file    Highest education level: Not on file   Occupational History    Not on file   Tobacco Use    Smoking status: Never    Smokeless tobacco: Not on file   Substance and Sexual Activity    Alcohol use: Not on file    Drug use: Not on file    Sexual activity: Not on file   Other Topics Concern    Parent/sibling w/ CABG, MI or angioplasty before 65F 55M? Not Asked   Social History Narrative    Not on file     Social Determinants of Health     Financial Resource Strain: Low Risk  (2/28/2023)    [facility]    Financial Resource Strain     Difficulty of Paying Living Expenses: 3     Difficulty of Paying Living Expenses: Not on file   Food Insecurity: No Food Insecurity (2/28/2023)    [facility]    Food Insecurity     Worried About Running Out of Food in the Last Year: 1   Transportation Needs: No Transportation Needs (2/28/2023)    Received from [employer] [facility]    Transportation Needs     Lack of Transportation (Medical): 1   Physical Activity: Not on file   Stress: Not on file   Social Connections: Unknown (2/29/2024)    Received from [facility] [employer] [facility]    Social Connections     Frequency of Communication with Friends and Family: Not on file   Interpersonal Safety: Not on file   Housing Stability: Low Risk  (2/28/2023)    Received from [facility] [employer] [facility]    Housing Stability     Unable to Pay for Housing in the Last Year: 1       Family History  No family history on file.    Allergy    Allergies   Allergen Reactions    Bee Venom Other (See Comments)    Metronidazole Nausea     lethargy    Sulfa Antibiotics Hives       Current Outpatient Medications   Medication Sig Dispense Refill    chlorthalidone (HYGROTON) 25 MG tablet Take 0.5 tablets by mouth daily.      potassium chloride ER (MICRO-K) 10 MEQ CR capsule Take 30 mEq by mouth.      progesterone (PROMETRIUM) 100 MG capsule Take 200 mg by mouth at bedtime.       No current facility-administered medications for this visit.       Physical Exam:   /78   Pulse 68   Ht 1.676 m (5' 6\")   Wt 75.3 kg (166 lb)   BMI 26.79 kg/m   No LMP recorded. Patient has had a hysterectomy. Body mass index is 26.79 kg/m .    Gen:  is alert, comfortable in no acute distress,   Abdomen: Abdomen is soft, non-tender, non-distended,   Lungs: non-labored breathing.     Pelvic Exam:   Normal external female genitalia. The urethra was Normal.    Vagina: stage 2 cystocele, mild atrophy  Uterus: surgically absent  Ovaries: No palpable mass   Vulva: No lesions, 2/4 pain with stretching of the vaginal opening mostly involving the pubococcygeus muscles   Rectal: Deferred     POPQ EXAM FOR PROLAPSE SEVERITY  (Aa):   -1 (Ba):   -1 (C ):    -7   (GH):   4 (PB):  2 (TVL):  8   (Ap):   -2 (Bp):  -2 (D):   na     ICS Stage (1-4):  2    Voiding trial:    PVR 11 mL " by Bladder ultrasound      Labs:   Color Urine (no units)   Date Value   09/11/2020 Straw     Appearance Urine (no units)   Date Value   09/11/2020 Slightly Cloudy     Glucose Urine (mg/dL)   Date Value   09/11/2020 Negative     Bilirubin Urine (no units)   Date Value   09/11/2020 Negative     Ketones Urine (mg/dL)   Date Value   09/11/2020 5 (A)     Specific Gravity Urine (no units)   Date Value   09/11/2020 1.014     pH Urine (pH)   Date Value   09/11/2020 6.0     Protein Albumin Urine (mg/dL)   Date Value   09/11/2020 Negative     Nitrite Urine (no units)   Date Value   09/11/2020 Negative     Leukocyte Esterase Urine (no units)   Date Value   09/11/2020 Negative     CBC RESULTS:   Recent Labs   Lab Test 09/11/20  0443   WBC 7.0   RBC 5.18   HGB 13.6   HCT 42.8   MCV 83   MCH 26.3*   MCHC 31.8   RDW 14.4        @lastcmp@    A/P: Kindra Herbert is a 64 year old F with     Kindra was seen today for pelvic pain.    Diagnoses and all orders for this visit:    Dyspareunia in female  -     Adult Urology  Referral    Vaginal atrophy  -     estradiol (ESTRACE) 0.1 MG/GM vaginal cream; Place 1 g vaginally three times a week. Ok to use your fingers or the applicator    Cystocele, midline        Estrace cream for atrophy  Encouraged to use dialator to stretch the vaginal opening more as previously instructed by her pelvic PT  Follow up with me as needed if her cystocele worsens (currently asymptomatic) or her dyspareunia does not improve      I spent a total of 45 minutes with  Kindra Herbert  on the date of the encounter in chart review, face to face patient visit, review of tests, documentation and/or discussion with other providers about the issues documented above.     Carlene Nichols MD, [facility]  , Department of OBGYN  Female Pelvic Medicine and Reconstructive Surgery ( Urogynecology)  CC  Patient Care Team:  Cassandra, Kory Parsons as PCP - Carlene Jo MD as MD  (OB/Gyn)  MIRIAN DAMON

## 2024-08-21 NOTE — LETTER
2024       RE: Kindra Herbert  [address] [facility] [address]     Dear Colleague,    Thank you for referring your patient, Kindra Herbert, to the [facility] [place] at [facility]. Please see a copy of my visit note below.    2024    Referring Provider: Lisa Anderson  [address] [place] [address]    Primary Care Provider: Clinic, [facility]    CC: pain with intercourse     HPI:  Kindra Herbert is a 64 year old  with med hx sig for  anxiety, cHTN, kidney stones, IBS  who presents for evaluation of pain with intercourse that started after her pelvic floor reconstruction surgery for vaginal vault prolapse in . She is s/p uterosacral ligament suspension and modified [facility] culdoplasty, anterior and posterior vaginal repairs, and cystoscopy. ( Alligna 20: ) . She says she feels a tearing type pain with intercourse. She has done some pelvic PT and tried vaginal estrace cream early on when this started and says did not help.       Urinary Symptoms/Voiding function  Denies bothersome stress or urgency leakage. Denies urinary urgency, frequency, nocturia. Denies hx of recurrent UTI or gross hematuria. Denies difficulty with voiding     Pelvic Organ Prolapse Symptoms  Denies vaginal bulge, pressure sensation or protrusion.    Gastrointestinal Symptoms:  Has occasional diarrhea ( hx of IBS) that has improved after cholecystectomy but can still be triggered occasionally with anxiety. No constipation or fecal incontinence. No defecatory issues.     Sexual function/Pelvic floor pain/GYN:   Sexually active. Pain with penetration/deep trust since her pelvic floor surgery in       Relevant Medical History:    Diabetes? no  High Blood pressure? yes     Recurrent UTIs? no  Sleep Apnea? no  Obesity? Body mass index is 26.79 kg/m .  History of Blood clots? no  Other medical problems: as abo  ve    Surgical History:      Past Surgical History:   Procedure Laterality Date     HYSTERECTOMY       LAPAROSCOPIC CHOLECYSTECTOMY N/A 09/12/2020    Procedure: CHOLECYSTECTOMY, LAPAROSCOPIC WITH LYSIS OF ADHESIONS;  Surgeon: Aye Munoz MD;  Location: [facility] OR       OB/Gyn History:  OB History   No obstetric history on file.       Medications/Vitamins/Supplements:   Current Outpatient Medications   Medication Sig Dispense Refill     chlorthalidone (HYGROTON) 25 MG tablet Take 0.5 tablets by mouth daily.       estradiol (ESTRACE) 0.1 MG/GM vaginal cream Place 1 g vaginally three times a week. Ok to use your fingers or the applicator 42.5 g 3     potassium chloride ER (MICRO-K) 10 MEQ CR capsule Take 30 mEq by mouth.       progesterone (PROMETRIUM) 100 MG capsule Take 200 mg by mouth at bedtime.       No current facility-administered medications for this visit.         Medical History:      Past Medical History:   Diagnosis Date     History of laparoscopy 1987     History of total vaginal hysterectomy 2006     Right kidney stone 2014    laser lithotripsy with stent placement     Vaginal vault prolapse, posthysterectomy      ROS  Social History    Social History     Socioeconomic History     Marital status:      Spouse name: Not on file     Number of children: Not on file     Years of education: Not on file     Highest education level: Not on file   Occupational History     Not on file   Tobacco Use     Smoking status: Never     Smokeless tobacco: Not on file   Substance and Sexual Activity     Alcohol use: Not on file     Drug use: Not on file     Sexual activity: Not on file   Other Topics Concern     Parent/sibling w/ CABG, MI or angioplasty before 65F 55M? Not Asked   Social History Narrative     Not on file     Social Determinants of Health     Financial Resource Strain: Low Risk  (2/28/2023)    Received from [email or web address]    Financial Resource Strain      Difficulty of  "Paying Living Expenses: 3      Difficulty of Paying Living Expenses: Not on file   Food Insecurity: No Food Insecurity (2/28/2023)    Received from [facility]    Food Insecurity      Worried About Running Out of Food in the Last Year: 1   Transportation Needs: No Transportation Needs (2/28/2023)    Received from [facility]    Transportation Needs      Lack of Transportation (Medical): 1   Physical Activity: Not on file   Stress: Not on file   Social Connections: Unknown (2/29/2024)    Received from [facility]    Social Connections      Frequency of Communication with Friends and Family: Not on file   Interpersonal Safety: Not on file   Housing Stability: Low Risk  (2/28/2023)    Received from [facility]    Housing Stability      Unable to Pay for Housing in the Last Year: 1       Family History  No family history on file.    Allergy    Allergies   Allergen Reactions     Bee Venom Other (See Comments)     Metronidazole Nausea     lethargy     Sulfa Antibiotics Hives       Current Outpatient Medications   Medication Sig Dispense Refill     chlorthalidone (HYGROTON) 25 MG tablet Take 0.5 tablets by mouth daily.       potassium chloride ER (MICRO-K) 10 MEQ CR capsule Take 30 mEq by mouth.       progesterone (PROMETRIUM) 100 MG capsule Take 200 mg by mouth at bedtime.       No current facility-administered medications for this visit.       Physical Exam:   /78   Pulse 68   Ht 1.676 m (5' 6\")   Wt 75.3 kg (166 lb)   BMI 26.79 kg/m   No LMP recorded. Patient has had a hysterectomy. Body mass index is 26.79 kg/m .    Gen:  is alert, comfortable in no acute distress,   Abdomen: Abdomen is soft, non-tender, non-distended,   Lungs: non-labored breathing.     Pelvic Exam:   Normal external female genitalia. The urethra was Normal.    Vagina: stage 2 cystocele, mild atrophy  Uterus: " surgically absent  Ovaries: No palpable mass   Vulva: No lesions, 2/4 pain with stretching of the vaginal opening mostly involving the pubococcygeus muscles   Rectal: Deferred     POPQ EXAM FOR PROLAPSE SEVERITY  (Aa):   -1 (Ba):   -1 (C ):    -7   (GH):   4 (PB):  2 (TVL):  8   (Ap):   -2 (Bp):  -2 (D):   na     ICS Stage (1-4):  2    Voiding trial:    PVR 11 mL by Bladder ultrasound      Labs:   Color Urine (no units)   Date Value   09/11/2020 Straw     Appearance Urine (no units)   Date Value   09/11/2020 Slightly Cloudy     Glucose Urine (mg/dL)   Date Value   09/11/2020 Negative     Bilirubin Urine (no units)   Date Value   09/11/2020 Negative     Ketones Urine (mg/dL)   Date Value   09/11/2020 5 (A)     Specific Gravity Urine (no units)   Date Value   09/11/2020 1.014     pH Urine (pH)   Date Value   09/11/2020 6.0     Protein Albumin Urine (mg/dL)   Date Value   09/11/2020 Negative     Nitrite Urine (no units)   Date Value   09/11/2020 Negative     Leukocyte Esterase Urine (no units)   Date Value   09/11/2020 Negative     CBC RESULTS:   Recent Labs   Lab Test 09/11/20  0443   WBC 7.0   RBC 5.18   HGB 13.6   HCT 42.8   MCV 83   MCH 26.3*   MCHC 31.8   RDW 14.4        @lastcmp@    A/P: Kindra Herbert is a 64 year old F with     Kindra was seen today for pelvic pain.    Diagnoses and all orders for this visit:    Dyspareunia in female  -     Adult Urology  Referral    Vaginal atrophy  -     estradiol (ESTRACE) 0.1 MG/GM vaginal cream; Place 1 g vaginally three times a week. Ok to use your fingers or the applicator    Cystocele, midline        Estrace cream for atrophy  Encouraged to use dialator to stretch the vaginal opening more as previously instructed by her pelvic PT  Follow up with me as needed if her cystocele worsens (currently asymptomatic) or her dyspareunia does not improve      I spent a total of 45 minutes with  Kindra Herbert  on the date of the encounter in chart review, face to  face patient visit, review of tests, documentation and/or discussion with other providers about the issues documented above.     Carlene Nichols MD, [facility]  , Department of OBGYN  Female Pelvic Medicine and Reconstructive Surgery ( Urogynecology)  CC  Patient Care Team:  Clinic, [facility]ons as PCP - General  Carlene Nichols MD as MD (OB/Gyn)  MIRIAN DAMON                Again, thank you for allowing me to participate in the care of your patient.      Sincerely,    Carlene Nichols MD

## 2024-08-21 NOTE — PATIENT INSTRUCTIONS
Thank you for trusting us with your care!   Please be aware, if you are on Mychart, you may see your results prior to your providers review. If labs are abnormal, we will call or message you on [employer] with a follow up plan.    If you need to contact us for questions about:  Symptoms, Scheduling & Medical Questions; Non-urgent (2-3 day response) [facility] message, Urgent (needing response today) [phone] (if after 3:30pm next day response)   Prescriptions: Please call your Pharmacy   Billing: Kp [phone] or [facility]:[phone]

## 2025-04-19 ENCOUNTER — HEALTH MAINTENANCE LETTER (OUTPATIENT)
Age: 65
End: 2025-04-19

## (undated) DEVICE — GLOVE PROTEXIS MICRO 5.5  2D73PM55

## (undated) DEVICE — ESU HOLSTER PLASTIC DISP E2400

## (undated) DEVICE — ENDO TROCAR FIRST ENTRY KII FIOS ADV FIX 05X100MM CFF03

## (undated) DEVICE — GLOVE PROTEXIS W/NEU-THERA 5.5  2D73TE55

## (undated) DEVICE — SU MONOCRYL 4-0 PS-2 18" UND Y496G

## (undated) DEVICE — GOWN LG DISP 9515

## (undated) DEVICE — DRAPE POUCH INSTRUMENT 3 POCKET 1018L

## (undated) DEVICE — ENDO TROCAR SLEEVE KII Z-THREADED 05X100MM CTS02

## (undated) DEVICE — STOCKING SLEEVE COMPRESSION CALF MED

## (undated) DEVICE — PREP CHLORAPREP 26ML TINTED ORANGE  260815

## (undated) DEVICE — ENDO TROCAR FIRST ENTRY KII FIOS Z-THRD 05X100MM CTF03

## (undated) DEVICE — ENDO TROCAR FIRST ENTRY KII FIOS Z-THRD 12X100MM CTF73

## (undated) DEVICE — SOL WATER IRRIG 1000ML BOTTLE 07139-09

## (undated) DEVICE — DECANTER VIAL 2006S

## (undated) DEVICE — ENDO TROCAR SLEEVE KII ADV FIXATION 05X100MM CFS02

## (undated) DEVICE — GLOVE PROTEXIS BLUE W/NEU-THERA 6.0  2D73EB60

## (undated) DEVICE — GLOVE PROTEXIS W/NEU-THERA 8.0  2D73TE80

## (undated) DEVICE — SU VICRYL 0 TIE 54" UND J287G

## (undated) DEVICE — ENDO TROCAR BLUNT TIP KII BALLOON 12X100MM C0R47

## (undated) DEVICE — ADH SKIN CLOSURE PREMIERPRO EXOFIN 1.0ML 3470

## (undated) DEVICE — DEVICE SUTURE GRASPER TROCAR CLOSURE 14GA PMITCSG

## (undated) DEVICE — SU VICRYL 0 UR-6 27" J603H

## (undated) DEVICE — ENDO TROCAR FIRST ENTRY KII FIOS Z-THRD 11X100MM CTF33

## (undated) DEVICE — Device

## (undated) DEVICE — ESU ENDO SCISSORS 5MM CVD 5DCS

## (undated) DEVICE — SOL NACL 0.9% IRRIG 1000ML BOTTLE 07138-09

## (undated) DEVICE — DRAPE TIBURON GENERAL ENDOSCOPY 9458

## (undated) DEVICE — SUCTION IRRIGATION STRYKFLOW II W/TIP DISP 250-070-520

## (undated) DEVICE — CLIP APPLIER ENDO 5MM M/L LIGAMAX EL5ML

## (undated) DEVICE — ESU HOLDER LAP INST DISP PURPLE LONG 330MM H-PRO-330

## (undated) DEVICE — SU VICRYL 4-0 FS-2 27" J422-H

## (undated) DEVICE — GOWN XLG DISP 9545

## (undated) DEVICE — ENDO POUCH UNIV RETRIEVAL SYSTEM INZII 10MM CD001

## (undated) DEVICE — ESU PENCIL W/COATED BLADE E2450H

## (undated) DEVICE — ESU CORD MONOPOLAR 10'  E0510

## (undated) RX ORDER — KETOROLAC TROMETHAMINE 30 MG/ML
INJECTION, SOLUTION INTRAMUSCULAR; INTRAVENOUS
Status: DISPENSED
Start: 2020-09-12

## (undated) RX ORDER — PROPOFOL 10 MG/ML
INJECTION, EMULSION INTRAVENOUS
Status: DISPENSED
Start: 2020-09-12

## (undated) RX ORDER — DEXAMETHASONE SODIUM PHOSPHATE 4 MG/ML
INJECTION, SOLUTION INTRA-ARTICULAR; INTRALESIONAL; INTRAMUSCULAR; INTRAVENOUS; SOFT TISSUE
Status: DISPENSED
Start: 2020-09-12

## (undated) RX ORDER — ONDANSETRON 2 MG/ML
INJECTION INTRAMUSCULAR; INTRAVENOUS
Status: DISPENSED
Start: 2020-09-12

## (undated) RX ORDER — LIDOCAINE HYDROCHLORIDE 10 MG/ML
INJECTION, SOLUTION EPIDURAL; INFILTRATION; INTRACAUDAL; PERINEURAL
Status: DISPENSED
Start: 2020-09-12

## (undated) RX ORDER — GLYCOPYRROLATE 0.2 MG/ML
INJECTION, SOLUTION INTRAMUSCULAR; INTRAVENOUS
Status: DISPENSED
Start: 2020-09-12

## (undated) RX ORDER — FENTANYL CITRATE-0.9 % NACL/PF 10 MCG/ML
PLASTIC BAG, INJECTION (ML) INTRAVENOUS
Status: DISPENSED
Start: 2020-09-12

## (undated) RX ORDER — FENTANYL CITRATE 50 UG/ML
INJECTION, SOLUTION INTRAMUSCULAR; INTRAVENOUS
Status: DISPENSED
Start: 2020-09-12

## (undated) RX ORDER — BUPIVACAINE HYDROCHLORIDE AND EPINEPHRINE 2.5; 5 MG/ML; UG/ML
INJECTION, SOLUTION EPIDURAL; INFILTRATION; INTRACAUDAL; PERINEURAL
Status: DISPENSED
Start: 2020-09-12